# Patient Record
Sex: FEMALE | Race: WHITE | NOT HISPANIC OR LATINO | Employment: OTHER | URBAN - METROPOLITAN AREA
[De-identification: names, ages, dates, MRNs, and addresses within clinical notes are randomized per-mention and may not be internally consistent; named-entity substitution may affect disease eponyms.]

---

## 2018-11-28 ENCOUNTER — TRANSFERRED RECORDS (OUTPATIENT)
Dept: HEALTH INFORMATION MANAGEMENT | Facility: CLINIC | Age: 78
End: 2018-11-28

## 2018-11-28 LAB
ALT SERPL-CCNC: 34 IU/L (ref 8–45)
ALT SERPL-CCNC: 34 IU/L (ref 8–45)
AST SERPL-CCNC: 31 IU/L (ref 2–40)
CREAT SERPL-MCNC: 1.01 MG/DL (ref 0.57–1.11)
CREAT SERPL-MCNC: 1.01 MG/DL (ref 0.57–1.11)
GFR SERPL CREATININE-BSD FRML MDRD: 53 ML/MIN/1.73M2
GFR SERPL CREATININE-BSD FRML MDRD: 53 ML/MIN/1.73M2
GLUCOSE SERPL-MCNC: 94 MG/DL (ref 65–100)
GLUCOSE SERPL-MCNC: 94 MG/DL (ref 65–100)
POTASSIUM SERPL-SCNC: 4 MMOL/L (ref 3.5–5)
POTASSIUM SERPL-SCNC: 4 MMOL/L (ref 3.5–5)

## 2018-12-10 ENCOUNTER — TRANSFERRED RECORDS (OUTPATIENT)
Dept: HEALTH INFORMATION MANAGEMENT | Facility: CLINIC | Age: 78
End: 2018-12-10

## 2019-01-28 ENCOUNTER — TELEPHONE (OUTPATIENT)
Dept: RHEUMATOLOGY | Facility: CLINIC | Age: 79
End: 2019-01-28

## 2019-01-28 NOTE — TELEPHONE ENCOUNTER
Called patient after chart review. Patient has tried to taper to 2.5mg prednisone without success as pain becomes unbearable again. She will be seen in February as a new patient, encouraged her to continue prescribed prednisone.

## 2019-01-28 NOTE — TELEPHONE ENCOUNTER
[facility]    Phone Message    May a detailed message be left on voicemail: yes    Reason for Call: Patient is taking 5mg daily.  PCP prescribed enough to get her to her visit with Dr. Beaulieu.  Patient is requesting a call to discuss if the Prednisone is going to affect her blood work and if she should taper off or continue the medication until she sees Dr. Beaulieu. Patient has been taking the medication since the first of December.  Please advise.  Thank you.     Action Taken: Message routed to:  Adult Clinics: Rheumatology p [phone]

## 2019-02-15 ENCOUNTER — OFFICE VISIT (OUTPATIENT)
Dept: RHEUMATOLOGY | Facility: CLINIC | Age: 79
End: 2019-02-15
Payer: MEDICARE

## 2019-02-15 VITALS
RESPIRATION RATE: 18 BRPM | TEMPERATURE: 97.6 F | SYSTOLIC BLOOD PRESSURE: 129 MMHG | DIASTOLIC BLOOD PRESSURE: 75 MMHG | OXYGEN SATURATION: 99 % | WEIGHT: 139.2 LBS | HEART RATE: 57 BPM | HEIGHT: 63 IN | BODY MASS INDEX: 24.66 KG/M2

## 2019-02-15 DIAGNOSIS — R76.8 POSITIVE ANA (ANTINUCLEAR ANTIBODY): ICD-10-CM

## 2019-02-15 DIAGNOSIS — M35.3 POLYMYALGIA RHEUMATICA (H): Primary | ICD-10-CM

## 2019-02-15 DIAGNOSIS — Z79.60 LONG-TERM USE OF IMMUNOSUPPRESSANT MEDICATION: ICD-10-CM

## 2019-02-15 DIAGNOSIS — R94.31 ABNORMAL EKG: ICD-10-CM

## 2019-02-15 LAB
ALBUMIN SERPL-MCNC: 3.9 G/DL (ref 3.4–5)
ALBUMIN UR-MCNC: NEGATIVE MG/DL
ALP SERPL-CCNC: 61 U/L (ref 40–150)
ALT SERPL W P-5'-P-CCNC: 29 U/L (ref 0–50)
ANION GAP SERPL CALCULATED.3IONS-SCNC: 6 MMOL/L (ref 3–14)
APPEARANCE UR: CLEAR
AST SERPL W P-5'-P-CCNC: 21 U/L (ref 0–45)
BACTERIA #/AREA URNS HPF: ABNORMAL /HPF
BASOPHILS # BLD AUTO: 0.1 10E9/L (ref 0–0.2)
BASOPHILS NFR BLD AUTO: 0.8 %
BILIRUB SERPL-MCNC: 0.5 MG/DL (ref 0.2–1.3)
BILIRUB UR QL STRIP: NEGATIVE
BUN SERPL-MCNC: 25 MG/DL (ref 7–30)
CALCIUM SERPL-MCNC: 9.3 MG/DL (ref 8.5–10.1)
CARDIOLIPIN IGA SERPL-ACNC: 0.6 APL U/ML (ref 0–19.9)
CHLORIDE SERPL-SCNC: 105 MMOL/L (ref 94–109)
CK SERPL-CCNC: 136 U/L (ref 30–225)
CO2 SERPL-SCNC: 29 MMOL/L (ref 20–32)
COLOR UR AUTO: ABNORMAL
CREAT SERPL-MCNC: 1.3 MG/DL (ref 0.52–1.04)
CRP SERPL-MCNC: 7.7 MG/L (ref 0–8)
DIFFERENTIAL METHOD BLD: NORMAL
ENA JO1 IGG SER-ACNC: <0.2 AI (ref 0–0.9)
ENA RNP IGG SER IA-ACNC: 2.1 AI (ref 0–0.9)
ENA SCL70 IGG SER IA-ACNC: <0.2 AI (ref 0–0.9)
ENA SM IGG SER-ACNC: <0.2 AI (ref 0–0.9)
ENA SS-A IGG SER IA-ACNC: <0.2 AI (ref 0–0.9)
ENA SS-B IGG SER IA-ACNC: <0.2 AI (ref 0–0.9)
EOSINOPHIL # BLD AUTO: 0.1 10E9/L (ref 0–0.7)
EOSINOPHIL NFR BLD AUTO: 1 %
ERYTHROCYTE [DISTWIDTH] IN BLOOD BY AUTOMATED COUNT: 12.8 % (ref 10–15)
ERYTHROCYTE [SEDIMENTATION RATE] IN BLOOD BY WESTERGREN METHOD: 30 MM/H (ref 0–30)
GFR SERPL CREATININE-BSD FRML MDRD: 39 ML/MIN/{1.73_M2}
GLUCOSE SERPL-MCNC: 95 MG/DL (ref 70–99)
GLUCOSE UR STRIP-MCNC: NEGATIVE MG/DL
HCT VFR BLD AUTO: 40.6 % (ref 35–47)
HGB BLD-MCNC: 13.2 G/DL (ref 11.7–15.7)
HGB UR QL STRIP: NEGATIVE
IMM GRANULOCYTES # BLD: 0 10E9/L (ref 0–0.4)
IMM GRANULOCYTES NFR BLD: 0.3 %
KETONES UR STRIP-MCNC: NEGATIVE MG/DL
LEUKOCYTE ESTERASE UR QL STRIP: NEGATIVE
LYMPHOCYTES # BLD AUTO: 1.7 10E9/L (ref 0.8–5.3)
LYMPHOCYTES NFR BLD AUTO: 18.9 %
MCH RBC QN AUTO: 32.2 PG (ref 26.5–33)
MCHC RBC AUTO-ENTMCNC: 32.5 G/DL (ref 31.5–36.5)
MCV RBC AUTO: 99 FL (ref 78–100)
MONOCYTES # BLD AUTO: 0.6 10E9/L (ref 0–1.3)
MONOCYTES NFR BLD AUTO: 7 %
NEUTROPHILS # BLD AUTO: 6.6 10E9/L (ref 1.6–8.3)
NEUTROPHILS NFR BLD AUTO: 72 %
NITRATE UR QL: NEGATIVE
NON-SQ EPI CELLS #/AREA URNS LPF: ABNORMAL /LPF
PH UR STRIP: 7.5 PH (ref 5–7)
PLATELET # BLD AUTO: 261 10E9/L (ref 150–450)
POTASSIUM SERPL-SCNC: 3.7 MMOL/L (ref 3.4–5.3)
PROT SERPL-MCNC: 8.1 G/DL (ref 6.8–8.8)
PTH-INTACT SERPL-MCNC: 49 PG/ML (ref 18–80)
RBC # BLD AUTO: 4.1 10E12/L (ref 3.8–5.2)
RBC #/AREA URNS AUTO: ABNORMAL /HPF
SODIUM SERPL-SCNC: 140 MMOL/L (ref 133–144)
SOURCE: ABNORMAL
SP GR UR STRIP: 1.01 (ref 1–1.03)
UROBILINOGEN UR STRIP-MCNC: NORMAL MG/DL (ref 0–2)
WBC # BLD AUTO: 9.1 10E9/L (ref 4–11)
WBC #/AREA URNS AUTO: ABNORMAL /HPF

## 2019-02-15 PROCEDURE — 81001 URINALYSIS AUTO W/SCOPE: CPT | Performed by: INTERNAL MEDICINE

## 2019-02-15 PROCEDURE — 86235 NUCLEAR ANTIGEN ANTIBODY: CPT | Performed by: INTERNAL MEDICINE

## 2019-02-15 PROCEDURE — 86160 COMPLEMENT ANTIGEN: CPT | Performed by: INTERNAL MEDICINE

## 2019-02-15 PROCEDURE — 86147 CARDIOLIPIN ANTIBODY EA IG: CPT | Performed by: INTERNAL MEDICINE

## 2019-02-15 PROCEDURE — 86038 ANTINUCLEAR ANTIBODIES: CPT | Performed by: INTERNAL MEDICINE

## 2019-02-15 PROCEDURE — 86146 BETA-2 GLYCOPROTEIN ANTIBODY: CPT | Performed by: INTERNAL MEDICINE

## 2019-02-15 PROCEDURE — 82784 ASSAY IGA/IGD/IGG/IGM EACH: CPT | Performed by: INTERNAL MEDICINE

## 2019-02-15 PROCEDURE — 93000 ELECTROCARDIOGRAM COMPLETE: CPT | Performed by: INTERNAL MEDICINE

## 2019-02-15 PROCEDURE — 86225 DNA ANTIBODY NATIVE: CPT | Performed by: INTERNAL MEDICINE

## 2019-02-15 PROCEDURE — 86140 C-REACTIVE PROTEIN: CPT | Performed by: INTERNAL MEDICINE

## 2019-02-15 PROCEDURE — 86334 IMMUNOFIX E-PHORESIS SERUM: CPT | Performed by: INTERNAL MEDICINE

## 2019-02-15 PROCEDURE — 86039 ANTINUCLEAR ANTIBODIES (ANA): CPT | Performed by: INTERNAL MEDICINE

## 2019-02-15 PROCEDURE — 83970 ASSAY OF PARATHORMONE: CPT | Performed by: INTERNAL MEDICINE

## 2019-02-15 PROCEDURE — 99205 OFFICE O/P NEW HI 60 MIN: CPT | Mod: 25 | Performed by: INTERNAL MEDICINE

## 2019-02-15 PROCEDURE — 85025 COMPLETE CBC W/AUTO DIFF WBC: CPT | Performed by: INTERNAL MEDICINE

## 2019-02-15 PROCEDURE — 85652 RBC SED RATE AUTOMATED: CPT | Performed by: INTERNAL MEDICINE

## 2019-02-15 PROCEDURE — 82550 ASSAY OF CK (CPK): CPT | Performed by: INTERNAL MEDICINE

## 2019-02-15 PROCEDURE — 36415 COLL VENOUS BLD VENIPUNCTURE: CPT | Performed by: INTERNAL MEDICINE

## 2019-02-15 PROCEDURE — 82306 VITAMIN D 25 HYDROXY: CPT | Performed by: INTERNAL MEDICINE

## 2019-02-15 PROCEDURE — 80053 COMPREHEN METABOLIC PANEL: CPT | Performed by: INTERNAL MEDICINE

## 2019-02-15 RX ORDER — ACETAMINOPHEN 325 MG/1
325 TABLET ORAL PRN
COMMUNITY

## 2019-02-15 RX ORDER — LEVOTHYROXINE SODIUM 75 UG/1
75 TABLET ORAL DAILY
COMMUNITY
Start: 2018-10-29

## 2019-02-15 RX ORDER — PREDNISONE 5 MG/1
5 TABLET ORAL DAILY
Qty: 60 TABLET | Refills: 1 | Status: SHIPPED | OUTPATIENT
Start: 2019-02-15 | End: 2019-02-18

## 2019-02-15 RX ORDER — IBUPROFEN 200 MG
200 TABLET ORAL EVERY 4 HOURS PRN
COMMUNITY
End: 2020-04-10

## 2019-02-15 RX ORDER — PREDNISONE 5 MG/1
5 TABLET ORAL DAILY
COMMUNITY
Start: 2019-01-28 | End: 2019-02-18

## 2019-02-15 ASSESSMENT — PAIN SCALES - GENERAL: PAINLEVEL: EXTREME PAIN (8)

## 2019-02-15 ASSESSMENT — MIFFLIN-ST. JEOR: SCORE: 1075.54

## 2019-02-15 NOTE — NURSING NOTE
"Libertad Sanchez's goals for this visit include: Consult  She requests these members of her care team be copied on today's visit information: PCP    PCP: Barber Lopez    Referring Provider:  Barber Lopez MD  [address] [address]    /75   Pulse 57   Temp 97.6  F (36.4  C)   Resp 18   Ht 1.6 m (5' 3\")   Wt 63.1 kg (139 lb 3.2 oz)   SpO2 99%   BMI 24.66 kg/m      Do you need any medication refills at today's visit? N    "

## 2019-02-16 LAB
CARDIOLIPIN ANTIBODY IGG: <1.6 GPL-U/ML (ref 0–19.9)
CARDIOLIPIN ANTIBODY IGM: 2.4 MPL-U/ML (ref 0–19.9)

## 2019-02-18 DIAGNOSIS — R76.8 POSITIVE ANA (ANTINUCLEAR ANTIBODY): ICD-10-CM

## 2019-02-18 DIAGNOSIS — M35.3 POLYMYALGIA RHEUMATICA (H): ICD-10-CM

## 2019-02-18 DIAGNOSIS — Z79.60 LONG-TERM USE OF IMMUNOSUPPRESSANT MEDICATION: ICD-10-CM

## 2019-02-18 LAB
ANA PAT SER IF-IMP: ABNORMAL
ANA SER QL IF: POSITIVE
ANA TITR SER IF: ABNORMAL {TITER}
B2 GLYCOPROT1 IGG SERPL IA-ACNC: 0.8 U/ML
C3 SERPL-MCNC: 98 MG/DL (ref 76–169)
C4 SERPL-MCNC: 21 MG/DL (ref 15–50)
DEPRECATED CALCIDIOL+CALCIFEROL SERPL-MC: <63 UG/L (ref 20–75)
DSDNA AB SER-ACNC: 1 IU/ML
IGA SERPL-MCNC: 156 MG/DL (ref 70–380)
IGG SERPL-MCNC: 1720 MG/DL (ref 695–1620)
IGM SERPL-MCNC: 78 MG/DL (ref 60–265)
PROT PATTERN SERPL IFE-IMP: ABNORMAL
VITAMIN D2 SERPL-MCNC: <5 UG/L
VITAMIN D3 SERPL-MCNC: 58 UG/L

## 2019-02-18 RX ORDER — PREDNISONE 5 MG/1
10 TABLET ORAL DAILY
Qty: 180 TABLET | Refills: 1 | Status: SHIPPED | OUTPATIENT
Start: 2019-02-18 | End: 2019-02-18

## 2019-02-18 RX ORDER — PREDNISONE 5 MG/1
10 TABLET ORAL DAILY
Qty: 180 TABLET | Refills: 1 | Status: SHIPPED | OUTPATIENT
Start: 2019-02-18 | End: 2019-04-26

## 2019-03-12 ENCOUNTER — TRANSFERRED RECORDS (OUTPATIENT)
Dept: HEALTH INFORMATION MANAGEMENT | Facility: CLINIC | Age: 79
End: 2019-03-12

## 2019-03-12 LAB — EJECTION FRACTION: 63

## 2019-03-19 ENCOUNTER — DOCUMENTATION ONLY (OUTPATIENT)
Dept: RHEUMATOLOGY | Facility: CLINIC | Age: 79
End: 2019-03-19

## 2019-03-29 NOTE — PROGRESS NOTES
Order(s) created erroneously. Erroneous order ID: [id number]   Order canceled by: DAMON WOLFE   Order cancel date/time: 03/29/2019 7:42 AM

## 2019-04-16 ENCOUNTER — OFFICE VISIT (OUTPATIENT)
Dept: ENDOCRINOLOGY | Facility: CLINIC | Age: 79
End: 2019-04-16
Attending: INTERNAL MEDICINE
Payer: MEDICARE

## 2019-04-16 VITALS
HEART RATE: 63 BPM | SYSTOLIC BLOOD PRESSURE: 114 MMHG | BODY MASS INDEX: 25.46 KG/M2 | WEIGHT: 143.7 LBS | OXYGEN SATURATION: 96 % | DIASTOLIC BLOOD PRESSURE: 70 MMHG

## 2019-04-16 DIAGNOSIS — Z79.52 CURRENT CHRONIC USE OF SYSTEMIC STEROIDS: Primary | ICD-10-CM

## 2019-04-16 PROCEDURE — 99204 OFFICE O/P NEW MOD 45 MIN: CPT | Performed by: INTERNAL MEDICINE

## 2019-04-16 NOTE — PATIENT INSTRUCTIONS
We will contact Dr. Cooper regarding tapering of steroid; prednisone dose.     Once you are on low dose prednisone; less than or equal to 5 mg daily; we will check you for possibility of adrenal insufficiency by doing blood work.  You will hear from us.

## 2019-04-16 NOTE — PROGRESS NOTES
Endocrinology Clinic Visit    Chief Complaint: Consult (adrenal insufficiency)     Information obtained from:Patient here with her daughter today.    Subjective:         HPI: Libertad Sanchez is a 78 year old female with history of possibly polymyalgia rheumatica currently on steroid therapy who is seen in consultation at Allan Beaulieu's request for possible adrenal insufficiency.    She has been having muscle pain, joint pains mainly involving upper extremity shoulder area.  This symptoms that started in October 2018.  In November her symptoms got worse to the point that she was able to barely walk to the bathroom due to muscle cramps and in December 2018 she was started on prednisone 20 mg daily and was treated with this dose for 1 week.  Later on prednisone was dropped to 5 mg daily.  She was recently evaluated by rheumatology clinic and during that visits her dose of prednisone was increased to 10 mg daily due to continued muscle pain joint pains and also dizziness.  She has been on prednisone 10 mg daily since February 2019.  At this visit patient denies any dizziness, muscle pain, joint pains, fatigue or tiredness.    When she was on 5 mg of prednisone her main symptoms were muscle aches, joint pain and occasional dizziness which was positional(assuming standing up position from sitting down position).  However, she did not have any nausea, vomiting, diarrhea or weight loss.  She added that she had lost about 10 pounds prior to December; before she was started on the steroid.  However she has gained back 4 pounds since.    Reviewed her outside records; her primary care physician is Dr. Lopez with [facility].  Her basic metabolic panel from November 2018 showed normal electrolytes and creatinine level.  This was prior to she was started on a steroid therapy.  Complete blood count was within the normal limits.  She is currently on levothyroxine 75 mcg daily; has a past history of hypothyroidism her last TSH in  October of thousand 18 was normal at 3.15.    History of osteoporosis last DEXA done in 2017.  She was treated with Actonel however stopped it in April 2018 due to muscle cramps.      CHELSEY positive.      No Known Allergies    Current Outpatient Medications   Medication Sig Dispense Refill     acetaminophen (TYLENOL) 325 MG tablet Take 325 mg by mouth as needed       Calcium Carb-Cholecalciferol 500-400 MG-UNIT CHEW Take 1 tablet by mouth daily       ibuprofen (ADVIL/MOTRIN) 200 MG tablet Take 200 mg by mouth every 4 hours as needed       levothyroxine (SYNTHROID/LEVOTHROID) 75 MCG tablet Take 75 mcg by mouth daily       predniSONE (DELTASONE) 5 MG tablet Take 10 mg by mouth daily. (Patient taking differently: Take 5 mg by mouth 2 times daily .) 180 tablet 1       Review of Systems     11 point review system (Constitutional, HENT, Eyes, Respiratory, Cardiovascular, Gastrointestinal, Genitourinary, Musculoskeletal,Neurological, Psychiatric/Behavioural, Endocrine) is negative or is as per HPI above    Past Medical History:   Diagnosis Date     Colitis      Hypothyroidism      Osteoporosis    Normal thyroid exam findings and no nodules appreciated    Past Surgical History:   Procedure Laterality Date     TONSILLECTOMY         Family History   Problem Relation Age of Onset     Alzheimer Disease Mother        Social History     Socioeconomic History     Marital status:      Spouse name: None     Number of children: None     Years of education: None     Highest education level: None   Occupational History     None   Social Needs     Financial resource strain: None     Food insecurity:     Worry: None     Inability: None     Transportation needs:     Medical: None     Non-medical: None   Tobacco Use     Smoking status: Never Smoker     Smokeless tobacco: Never Used   Substance and Sexual Activity     Alcohol use: None     Drug use: None     Sexual activity: None   Lifestyle     Physical activity:     Days per week:  None     Minutes per session: None     Stress: None   Relationships     Social connections:     Talks on phone: None     Gets together: None     Attends Confucianist service: None     Active member of club or organization: None     Attends meetings of clubs or organizations: None     Relationship status: None     Intimate partner violence:     Fear of current or ex partner: None     Emotionally abused: None     Physically abused: None     Forced sexual activity: None   Other Topics Concern     None   Social History Narrative     None       Objective:   /70 (BP Location: Left arm, Patient Position: Sitting, Cuff Size: Adult Regular)   Pulse 63   Wt 65.2 kg (143 lb 11.2 oz)   SpO2 96%   BMI 25.46 kg/m    Constitutional: Pleasant no acute cardiopulmonary distress.   EYES: anicteric, normal extra-ocular movements, no lid lag or retraction.  HEENT: Mouth/Throat: Mucous membrane is moist. Oropharynx is clear.  Thyroid examination: Thyroid is atrophic.  No nodules appreciated  Cardiovascular: RRR, S1, S2 normal.   Pulmonary/Chest: CTAB. No wheezing or rales.   Abdominal: +BS. Non tender to palpation.    Neurological: Alert and oriented.  No tremor and reflexes are symmetrical bilaterally and within the normal limits. Muscle strength 5/5.   Extremities: No edema.  Psychological: appropriate mood and affect     In House Labs:      Ref. Range 2/15/2019 10:16   Sodium Latest Ref Range: 133 - 144 mmol/L 140   Potassium Latest Ref Range: 3.4 - 5.3 mmol/L 3.7   Chloride Latest Ref Range: 94 - 109 mmol/L 105   Carbon Dioxide Latest Ref Range: 20 - 32 mmol/L 29   Urea Nitrogen Latest Ref Range: 7 - 30 mg/dL 25   Creatinine Latest Ref Range: 0.52 - 1.04 mg/dL 1.30 (H)   GFR Estimate Latest Ref Range: >60 mL/min/1.73_m2 39 (L)   GFR Estimate If Black Latest Ref Range: >60 mL/min/1.73_m2 45 (L)   Calcium Latest Ref Range: 8.5 - 10.1 mg/dL 9.3   Anion Gap Latest Ref Range: 3 - 14 mmol/L 6   Albumin Latest Ref Range: 3.4 - 5.0  g/dL 3.9   Protein Total Latest Ref Range: 6.8 - 8.8 g/dL 8.1   Bilirubin Total Latest Ref Range: 0.2 - 1.3 mg/dL 0.5   Alkaline Phosphatase Latest Ref Range: 40 - 150 U/L 61   ALT Latest Ref Range: 0 - 50 U/L 29   AST Latest Ref Range: 0 - 45 U/L 21   25 OH Vit D total Latest Ref Range: 20 - 75 ug/L <63   25 OH Vit D2 Latest Units: ug/L <5   25 OH Vit D3 Latest Units: ug/L 58   CK Total Latest Ref Range: 30 - 225 U/L 136       Creatinine   Date Value Ref Range Status   02/15/2019 1.30 (H) 0.52 - 1.04 mg/dL Final       Assessment/Treatment Plan:      Current chronic steroid use; concern for possible adrenal insufficiency: patient with history of possible polymyalgia rheumatica vis-à-vis connective tissue disorder with antinuclear antibody positive currently on a steroid therapy.  Therapy with prednisone was started in December 2018 mainly for muscle cramps, joint pains and stiffness.  Prednisone initial dose was 20 mg daily and after a week it was dropped to 5 mg daily.  Patient did not feel well on prednisone 5 mg daily therefore her dose was increased to 10 mg daily.  She has been on prednisone 10 mg daily at least for the last 6 weeks.    Patient's signs and symptoms prior to steroid therapy and also after reduction dose of prednisone 5 mg daily; not consistent with adrenal insufficiency.  In addition, prednisone 5 mg daily is more than physiologic dose; therefore would not expect the patient to have adrenal insufficiency symptoms on this dose.  The fact that patient continues to have symptoms while taking prednisone 5 mg daily makes me think of adrenal insufficiency this patient is less likely.  I have reviewed her outside records particularly basic metabolic panels from November 2018 before she was started on steroid which were normal.    Even though the likelihood of adrenal insufficiency is low in this patient; would be reasonable completely rule out by checking the hypothalamus pituitary axis.  Currently the  hypothalamus pituitary axis would be suppressed due to the super-physiologic exogenous steroid she is getting.  In order to test, the first step would be to slowly taper down her prednisone to the lowest possible dose and check the HPA axis.  Discussed with Dr. Beaulieu; would let us know once patient is on a lower dose of prednisone.      Once she is on the lowest possible dose of prednisone; = or less than 5 mg; will check a morning cortisol with ACTH level or will perform a ACTH stim test to screen for adrenal insufficiency.    Osteoporosis: History of osteoporosis last DEXA done in 2017.  She was treated with Actonel however stopped it in April 2018 due to muscle cramps.  She stated that regarding this issue she would like to follow-up with her primary care physician.      Patient Instructions   We will contact Dr. Cooper regarding tapering of steroid; prednisone dose.     Once you are on low dose prednisone; less than or equal to 5 mg daily; we will check you for possibility of adrenal insufficiency by doing blood work.  You will hear from us.       I will contact the patient with the test results.  Return to clinic in 6 months.    Test and/or medications prescribed today:  No orders of the defined types were placed in this encounter.        Marvel Ignacio MD  Staff Endocrinologist    [phone]  Division of Endocrinology and Diabetes

## 2019-04-16 NOTE — NURSING NOTE
Libertad Sanchez's goals for this visit include:   Chief Complaint   Patient presents with     Consult     adrenal insufficiency     She requests these members of her care team be copied on today's visit information: Yes    PCP: Barber Lopez    Referring Provider:  Allan Beaulieu MD  [address] [place] [id number]    /70 (BP Location: Left arm, Patient Position: Sitting, Cuff Size: Adult Regular)   Pulse 63   Wt 65.2 kg (143 lb 11.2 oz)   SpO2 96%   BMI 25.46 kg/m      Do you need any medication refills at today's visit? No

## 2019-04-16 NOTE — Clinical Note
Dr. Beaulieu, thank you for sending this patient.  I saw Libertad today regarding the question of possible adrenal insufficiency.  Clinically it does not look like that she has adrenal insufficiency; however would be reasonable to screen her with biochemical testing.  In order to do that, she needs to be on the prednisone closer to physiologic dose which is 5 mg.  I wanted to check with you the plan regarding prednisone from a rheumatologic standpoint.  If current dose of prednisone is not needed from a rheumatologic standpoint and tapering it is okay; I will contact the patient to slowly taper in preparation for the workup.  If current dose of prednisone therapy is needed from a rheumatologic standpoint; then we can wait until tapering is possible.  In the meantime, she should be covered from AI stand point until prednisone dose going to less than 5 mg. Please let me know your thoughts when you get a chance. Thank you, Marvel Ignacio

## 2019-04-26 ENCOUNTER — OFFICE VISIT (OUTPATIENT)
Dept: RHEUMATOLOGY | Facility: CLINIC | Age: 79
End: 2019-04-26
Payer: MEDICARE

## 2019-04-26 VITALS
HEART RATE: 61 BPM | RESPIRATION RATE: 18 BRPM | TEMPERATURE: 97.6 F | HEIGHT: 63 IN | SYSTOLIC BLOOD PRESSURE: 135 MMHG | DIASTOLIC BLOOD PRESSURE: 72 MMHG | WEIGHT: 143 LBS | BODY MASS INDEX: 25.34 KG/M2 | OXYGEN SATURATION: 97 %

## 2019-04-26 DIAGNOSIS — M35.3 PMR (POLYMYALGIA RHEUMATICA) (H): Chronic | ICD-10-CM

## 2019-04-26 DIAGNOSIS — R76.8 POSITIVE ANA (ANTINUCLEAR ANTIBODY): ICD-10-CM

## 2019-04-26 DIAGNOSIS — M19.042 PRIMARY OSTEOARTHRITIS OF BOTH HANDS: Chronic | ICD-10-CM

## 2019-04-26 DIAGNOSIS — Z79.60 LONG-TERM USE OF IMMUNOSUPPRESSANT MEDICATION: ICD-10-CM

## 2019-04-26 DIAGNOSIS — M81.0 AGE-RELATED OSTEOPOROSIS WITHOUT CURRENT PATHOLOGICAL FRACTURE: Chronic | ICD-10-CM

## 2019-04-26 DIAGNOSIS — M19.041 PRIMARY OSTEOARTHRITIS OF BOTH HANDS: Chronic | ICD-10-CM

## 2019-04-26 DIAGNOSIS — M35.3 POLYMYALGIA RHEUMATICA (H): ICD-10-CM

## 2019-04-26 LAB
ALBUMIN SERPL-MCNC: 3.9 G/DL (ref 3.4–5)
ALBUMIN UR-MCNC: NEGATIVE MG/DL
ALP SERPL-CCNC: 54 U/L (ref 40–150)
ALT SERPL W P-5'-P-CCNC: 38 U/L (ref 0–50)
ANION GAP SERPL CALCULATED.3IONS-SCNC: 3 MMOL/L (ref 3–14)
APPEARANCE UR: CLEAR
AST SERPL W P-5'-P-CCNC: 28 U/L (ref 0–45)
BILIRUB SERPL-MCNC: 0.5 MG/DL (ref 0.2–1.3)
BILIRUB UR QL STRIP: NEGATIVE
BUN SERPL-MCNC: 20 MG/DL (ref 7–30)
CALCIUM SERPL-MCNC: 9.3 MG/DL (ref 8.5–10.1)
CHLORIDE SERPL-SCNC: 105 MMOL/L (ref 94–109)
CO2 SERPL-SCNC: 31 MMOL/L (ref 20–32)
COLOR UR AUTO: NORMAL
CREAT SERPL-MCNC: 1.04 MG/DL (ref 0.52–1.04)
CREAT UR-MCNC: 27 MG/DL
CRP SERPL-MCNC: 11.9 MG/L (ref 0–8)
ERYTHROCYTE [DISTWIDTH] IN BLOOD BY AUTOMATED COUNT: 13.3 % (ref 10–15)
ERYTHROCYTE [SEDIMENTATION RATE] IN BLOOD BY WESTERGREN METHOD: 26 MM/H (ref 0–30)
GFR SERPL CREATININE-BSD FRML MDRD: 51 ML/MIN/{1.73_M2}
GLUCOSE SERPL-MCNC: 96 MG/DL (ref 70–99)
GLUCOSE UR STRIP-MCNC: NEGATIVE MG/DL
HCT VFR BLD AUTO: 41.7 % (ref 35–47)
HGB BLD-MCNC: 13.6 G/DL (ref 11.7–15.7)
HGB UR QL STRIP: NEGATIVE
KETONES UR STRIP-MCNC: NEGATIVE MG/DL
LEUKOCYTE ESTERASE UR QL STRIP: NEGATIVE
MCH RBC QN AUTO: 32.2 PG (ref 26.5–33)
MCHC RBC AUTO-ENTMCNC: 32.6 G/DL (ref 31.5–36.5)
MCV RBC AUTO: 99 FL (ref 78–100)
NITRATE UR QL: NEGATIVE
PH UR STRIP: 7 PH (ref 5–7)
PLATELET # BLD AUTO: 230 10E9/L (ref 150–450)
POTASSIUM SERPL-SCNC: 3.8 MMOL/L (ref 3.4–5.3)
PROT SERPL-MCNC: 8 G/DL (ref 6.8–8.8)
PROT UR-MCNC: 0.07 G/L
PROT/CREAT 24H UR: 0.25 G/G CR (ref 0–0.2)
RBC # BLD AUTO: 4.22 10E12/L (ref 3.8–5.2)
RBC #/AREA URNS AUTO: NORMAL /HPF
SODIUM SERPL-SCNC: 139 MMOL/L (ref 133–144)
SOURCE: NORMAL
SP GR UR STRIP: 1.01 (ref 1–1.03)
UROBILINOGEN UR STRIP-MCNC: NORMAL MG/DL (ref 0–2)
WBC # BLD AUTO: 10 10E9/L (ref 4–11)
WBC #/AREA URNS AUTO: NORMAL /HPF

## 2019-04-26 PROCEDURE — 85027 COMPLETE CBC AUTOMATED: CPT | Performed by: INTERNAL MEDICINE

## 2019-04-26 PROCEDURE — 36415 COLL VENOUS BLD VENIPUNCTURE: CPT | Performed by: INTERNAL MEDICINE

## 2019-04-26 PROCEDURE — 85652 RBC SED RATE AUTOMATED: CPT | Performed by: INTERNAL MEDICINE

## 2019-04-26 PROCEDURE — 84156 ASSAY OF PROTEIN URINE: CPT | Performed by: INTERNAL MEDICINE

## 2019-04-26 PROCEDURE — 80053 COMPREHEN METABOLIC PANEL: CPT | Performed by: INTERNAL MEDICINE

## 2019-04-26 PROCEDURE — 81001 URINALYSIS AUTO W/SCOPE: CPT | Performed by: INTERNAL MEDICINE

## 2019-04-26 PROCEDURE — 86140 C-REACTIVE PROTEIN: CPT | Performed by: INTERNAL MEDICINE

## 2019-04-26 PROCEDURE — 99215 OFFICE O/P EST HI 40 MIN: CPT | Performed by: INTERNAL MEDICINE

## 2019-04-26 RX ORDER — PREDNISONE 5 MG/1
TABLET ORAL
Qty: 270 TABLET | Refills: 1 | Status: SHIPPED | OUTPATIENT
Start: 2019-04-26 | End: 2019-07-19

## 2019-04-26 ASSESSMENT — MIFFLIN-ST. JEOR: SCORE: 1092.77

## 2019-04-26 ASSESSMENT — PAIN SCALES - GENERAL: PAINLEVEL: EXTREME PAIN (8)

## 2019-04-26 ASSESSMENT — ROUTINE ASSESSMENT OF PATIENT INDEX DATA (RAPID3)
RAPID3 INTERPRETATION: HIGH > 12.0
TOTAL RAPID3 SCORE: 16.3

## 2019-04-26 NOTE — NURSING NOTE
"Libertad Sanchez's goals for this visit include: Return  She requests these members of her care team be copied on today's visit information: PCP    PCP: Barber Lopez    Referring Provider:  No referring provider defined for this encounter.    /72   Pulse 61   Temp 97.6  F (36.4  C)   Resp 18   Ht 1.6 m (5' 3\")   Wt 64.9 kg (143 lb)   SpO2 97%   BMI 25.33 kg/m      Do you need any medication refills at today's visit? N    "

## 2019-04-26 NOTE — PROGRESS NOTES
Ms. Sanchez returns for evaluation and management of chronic arthralgias, myalgias thought to be PMR. +CHELSEY 1:320, +RNP, dsDNA-, Sm-, SSA/B-,     She reports less dizziness and palpitation today on the higher dose of prednisone. An EKG questioned possible septal infarct, but an echocardiogram was benign. She will continue to monitor this with her primary care provider.    Her recent evaluation demonstrated the presence of the RNP antibody in addition to her CHELSEY. She has tolerated the increase in prednisone to 10 mg daily, and feels this higher dose has made her feel better. She has more significant pain on intermittent days. No significant AM stiffness. Energy is still reduced. She has had physical therapy. Facial redness persists but no other rash today. No oral ulcers. Her joints are stable and no swelling, redness, or warmth. During pain attacks this is more a myalgia.     Ibuprofen 400 mg is currently only rarely used.     PMI:  Medical-hypothyroidism, osteoporosis, empyema, carpal tunnel disease right, collagenous colitis, plantar fasciitis, eczema  Surgical-bunionectomy bilateral, carpal tunnel release, cataract surgery bilateral, right lung resection/pleural surgery/evacuation, tonsillectomy  Injuries-none    SH:  , previous  provider, 5 children, no tobacco or EtOH. Right handed.    FH:  Mother dead with alzheimer's  Father dead with MI  Sibs and children are healthy    PMSF history personally obtained and updated by me this visit.    ROS:  +persistent colitis  +actonel and alendronate intolerant  Remainder of the 14 point ROS obtained and found negative.    Physical Exam:  Constitutional: WD-[id number] cooperative  Eyes: nl EOM, PERRLA, vision, conjunctiva, sclera  ENT: nl external ears, nose, hearing, lips, teeth, gums, throat  Neck: no mass or thyroid enlargement  Resp: lungs clear to auscultation, nl to palpation, nl effort  CV: RRR, no murmurs, rubs or gallops, no edema  GI: no ABD mass or  tenderness, no HSM  : not tested  Lymph: no cervical or epitrochlear nodes  MS: +diffuse heberden's nodes and right PIP 2,3 perez's nodes; subtle 1st CMC squaring and thumb Z-laxity; normal , tuck and prayer; neck rotation limited to 30 degrees right and 45 degrees left with slightly reduced flexion and extension; All other TMJ, neck, shoulder, elbow, wrist, hand, spine, hip, knee, ankle, and foot joints were examined and otherwise found normal. No active synovitis.  Skin: no nail pitting, alopecia, rash  Neuro: nl cranial nerves, strength, sensation, DTRs.   Psych: nl judgement, orientation, memory, affect.    Laboratory:       Component      Latest Ref Rng & Units 2/15/2019   WBC      4.0 - 11.0 10e9/L 9.1   RBC Count      3.8 - 5.2 10e12/L 4.10   Hemoglobin      11.7 - 15.7 g/dL 13.2   Hematocrit      35.0 - 47.0 % 40.6   MCV      78 - 100 fl 99   MCH      26.5 - 33.0 pg 32.2   MCHC      31.5 - 36.5 g/dL 32.5   RDW      10.0 - 15.0 % 12.8   Platelet Count      150 - 450 10e9/L 261   Diff Method       Automated Method   % Neutrophils      % 72.0   % Lymphocytes      % 18.9   % Monocytes      % 7.0   % Eosinophils      % 1.0   % Basophils      % 0.8   % Immature Granulocytes      % 0.3   Absolute Neutrophil      1.6 - 8.3 10e9/L 6.6   Absolute Lymphocytes      0.8 - 5.3 10e9/L 1.7   Absolute Monocytes      0.0 - 1.3 10e9/L 0.6   Absolute Eosinophils      0.0 - 0.7 10e9/L 0.1   Absolute Basophils      0.0 - 0.2 10e9/L 0.1   Abs Immature Granulocytes      0 - 0.4 10e9/L 0.0   Color Urine       Light Yellow   Appearance Urine       Clear   Glucose Urine      NEG:Negative mg/dL Negative   Bilirubin Urine      NEG:Negative Negative   Ketones Urine      NEG:Negative mg/dL Negative   Specific Gravity Urine      1.003 - 1.035 1.006   Blood Urine      NEG:Negative Negative   pH Urine      5.0 - 7.0 pH 7.5 (H)   Protein Albumin Urine      NEG:Negative mg/dL Negative   Urobilinogen mg/dL      0.0 - 2.0 mg/dL Normal    Nitrite Urine      NEG:Negative Negative   Leukocyte Esterase Urine      NEG:Negative Negative   Source       Midstream Urine   WBC Urine      OTO5:0 - 5 /HPF 0 - 5   RBC Urine      OTO2:O - 2 /HPF O - 2   Bacteria Urine      NEG:Negative /HPF Few (A)   Squamous Epithelial /LPF Urine      FEW:Few /LPF Few   Sodium      133 - 144 mmol/L 140   Potassium      3.4 - 5.3 mmol/L 3.7   Chloride      94 - 109 mmol/L 105   Carbon Dioxide      20 - 32 mmol/L 29   Anion Gap      3 - 14 mmol/L 6   Glucose      70 - 99 mg/dL 95   Urea Nitrogen      7 - 30 mg/dL 25   Creatinine      0.52 - 1.04 mg/dL 1.30 (H)   GFR Estimate      >60 mL/min/1.73:m2 39 (L)   GFR Estimate If Black      >60 mL/min/1.73:m2 45 (L)   Calcium      8.5 - 10.1 mg/dL 9.3   Bilirubin Total      0.2 - 1.3 mg/dL 0.5   Albumin      3.4 - 5.0 g/dL 3.9   Protein Total      6.8 - 8.8 g/dL 8.1   Alkaline Phosphatase      40 - 150 U/L 61   ALT      0 - 50 U/L 29   AST      0 - 45 U/L 21   RNP Antibody IgG      0.0 - 0.9 AI 2.1 (H)   Harris SANDRO Antibody IgG      0.0 - 0.9 AI <0.2   SSA (Ro) (SANDRO) Antibody, IgG      0.0 - 0.9 AI <0.2   SSB (La) (SANDRO) Antibody, IgG      0.0 - 0.9 AI <0.2   Scleroderma Antibody Scl-70 SANDRO IgG      0.0 - 0.9 AI <0.2   Immunofixation ELP       No monoclonal protein seen on immunofixation.  Pathological significance requires clinical . . .   IGG      695 - 1,620 mg/dL 1,720 (H)   IGA      70 - 380 mg/dL 156   IGM      60 - 265 mg/dL 78   CHELSEY interpretation      NEG:Negative Positive (A)   CHELSEY pattern 1       SPECKLED   CHELSEY titer 1       1:320   25 OH Vit D2      ug/L <5   25 OH Vit D3      ug/L 58   25 OH Vit D total      20 - 75 ug/L <63   Cardiolipin Antibody IgG      0.0 - 19.9 GPL-U/mL <1.6   Cardiolipin Antibody IgM      0.0 - 19.9 MPL-U/mL 2.4   DNA-ds      <10 IU/mL 1   Cardiolipin Antibody IgA      0.0 - 19.9 APL U/mL 0.6   Mery 1 Antibody IgG      0.0 - 0.9 AI <0.2   Beta 2 Glycoprotein 1 Antibody IgG      <7 U/mL 0.8   Sed Rate       0 - 30 mm/h 30   CRP Inflammation      0.0 - 8.0 mg/L 7.7   Complement C4      15 - 50 mg/dL 21   Complement C3      76 - 169 mg/dL 98   CK Total      30 - 225 U/L 136   Parathyroid Hormone Intact      18 - 80 pg/mL 49     Component      Latest Ref Rng & Units 4/26/2019   Sodium      133 - 144 mmol/L 139   Potassium      3.4 - 5.3 mmol/L 3.8   Chloride      94 - 109 mmol/L 105   Carbon Dioxide      20 - 32 mmol/L 31   Anion Gap      3 - 14 mmol/L 3   Glucose      70 - 99 mg/dL 96   Urea Nitrogen      7 - 30 mg/dL 20   Creatinine      0.52 - 1.04 mg/dL 1.04   GFR Estimate      >60 mL/min/1.73:m2 51 (L)   GFR Estimate If Black      >60 mL/min/1.73:m2 59 (L)   Calcium      8.5 - 10.1 mg/dL 9.3   Bilirubin Total      0.2 - 1.3 mg/dL 0.5   Albumin      3.4 - 5.0 g/dL 3.9   Protein Total      6.8 - 8.8 g/dL 8.0   Alkaline Phosphatase      40 - 150 U/L 54   ALT      0 - 50 U/L 38   AST      0 - 45 U/L 28   Color Urine       Straw   Appearance Urine       Clear   Glucose Urine      NEG:Negative mg/dL Negative   Bilirubin Urine      NEG:Negative Negative   Ketones Urine      NEG:Negative mg/dL Negative   Specific Gravity Urine      1.003 - 1.035 1.006   Blood Urine      NEG:Negative Negative   pH Urine      5.0 - 7.0 pH 7.0   Protein Albumin Urine      NEG:Negative mg/dL Negative   Urobilinogen mg/dL      0.0 - 2.0 mg/dL Normal   Nitrite Urine      NEG:Negative Negative   Leukocyte Esterase Urine      NEG:Negative Negative   Source       Midstream Urine   WBC Urine      OTO5:0 - 5 /HPF 0 - 5   RBC Urine      OTO2:O - 2 /HPF O - 2   WBC      4.0 - 11.0 10e9/L 10.0   RBC Count      3.8 - 5.2 10e12/L 4.22   Hemoglobin      11.7 - 15.7 g/dL 13.6   Hematocrit      35.0 - 47.0 % 41.7   MCV      78 - 100 fl 99   MCH      26.5 - 33.0 pg 32.2   MCHC      31.5 - 36.5 g/dL 32.6   RDW      10.0 - 15.0 % 13.3   Platelet Count      150 - 450 10e9/L 230   Protein Random Urine      g/L 0.07   Protein Total Urine g/gr Creatinine      0 -  0.2 g/g Cr 0.25 (H)   CRP Inflammation      0.0 - 8.0 mg/L 11.9 (H)   Sed Rate      0 - 30 mm/h 26   Creatinine Urine      mg/dL 27     Impression:    Polymyalgia rheumatica-with musculoskeletal pain and stiffness associated with a history of elevated inflammatory markers. Incomplete response to prednisone 10 mg daily at this point despite near normalization of inflammatory markers. New finding of +RNP antibody raises concern for chronic mixed connective tissue disease. I do not think that clinically this represents MCTD, in the absence of synovitis, hand pain, or RF. Nevertheless, she has not yet resolved her pain problems with low dose prednisone therapy, so this may represent an atypical presentation of connective tissue disease. Based on this I plan to re-initiate therapy PMR with 20 mg daily for 1 month and then taper by 5 mg daily each month thereafter. If this fails to have a durable therapeutic effect, then consider treatment with hydroxychloroquine and/or methotrexate.    Osteoarthritis-of the hands. This is stable.    Osteoporosis-she has a DEXA planned with her primary care provider per endocrinology recommendation. Monitor bone health with use of prednisone.    Long term management of immunosuppression-get laboratory testing today.

## 2019-05-15 ENCOUNTER — MYC MEDICAL ADVICE (OUTPATIENT)
Dept: ENDOCRINOLOGY | Facility: CLINIC | Age: 79
End: 2019-05-15

## 2019-05-16 NOTE — TELEPHONE ENCOUNTER
Please see VPEP message for this patient. I couldn't find the DEXA report. Can we please advise her to get it faxed to us.

## 2019-05-21 NOTE — TELEPHONE ENCOUNTER
Per Dr. Gonzalez:    Please advise patient to schedule follow up visit to discuss next steps regarding osteoporosis management.      Routing comment      Patient is scheduled for 8/6/19 at 1pm.    Shani Faulkner CMA  Adult Endocrinology  [facility]

## 2019-05-21 NOTE — TELEPHONE ENCOUNTER
RESULT LUMBAR SPINE L1-L4  BMD (gm/cm2) 1.113  T Score: -0.6       RESULT RIGHT FEMORAL NECK  BMD (gm/cm2) 0.829   T Score:  -1.5     RESULT RIGHT TOTAL HIP  BMD (gm/cm2) 0.877  T Score: -1.0     This patient's T-score meets the World Health Organization (WHO) criteria   for low bone density (osteopenia)at one or more measured sites (T-score   between less than -1.0 and greater than -2.5).   The risk of osteoporotic   fracture increases approximately two-fold for each 1.0 SD decrease in   T-score.    This patient's FRAX score for major osteoporotic fracture is 35.5 %.  This patient's FRAX score for hip fracture is 22.2 %.    The WHO Fracture Risk Assessment Tool calculates the 10 year probability   of fracture. This is for patients who are not being treated, to help in   the decision of when to treat.     RECOMMENDATIONS: The National Osteoporosis Foundation recommends   pharmacolgoic treatment for post-menopausal women and men >50 with:   1) prior hip or vertebral fracture (Fragility Type);   2) T-score at spine or hip -2.5 or lower; or   3) Total fracture risk is 20% or greater or hip fracture risk is 3% or   greater when applying FRAX.      Treatment for osteoporosis indicated based on DEXA scan report documented above and current use of chronic steroid therapy.  Please advise patient to schedule follow up visit to discuss next steps regarding osteoporosis management.

## 2019-07-19 ENCOUNTER — OFFICE VISIT (OUTPATIENT)
Dept: RHEUMATOLOGY | Facility: CLINIC | Age: 79
End: 2019-07-19
Payer: MEDICARE

## 2019-07-19 VITALS
HEIGHT: 63 IN | OXYGEN SATURATION: 97 % | WEIGHT: 136.4 LBS | HEART RATE: 54 BPM | RESPIRATION RATE: 14 BRPM | BODY MASS INDEX: 24.17 KG/M2 | DIASTOLIC BLOOD PRESSURE: 71 MMHG | SYSTOLIC BLOOD PRESSURE: 114 MMHG

## 2019-07-19 DIAGNOSIS — M81.0 AGE-RELATED OSTEOPOROSIS WITHOUT CURRENT PATHOLOGICAL FRACTURE: Chronic | ICD-10-CM

## 2019-07-19 DIAGNOSIS — M19.041 PRIMARY OSTEOARTHRITIS OF BOTH HANDS: Chronic | ICD-10-CM

## 2019-07-19 DIAGNOSIS — M35.3 PMR (POLYMYALGIA RHEUMATICA) (H): Chronic | ICD-10-CM

## 2019-07-19 DIAGNOSIS — R76.8 POSITIVE ANA (ANTINUCLEAR ANTIBODY): ICD-10-CM

## 2019-07-19 DIAGNOSIS — R00.2 PALPITATIONS: Primary | ICD-10-CM

## 2019-07-19 DIAGNOSIS — M35.3 POLYMYALGIA RHEUMATICA (H): ICD-10-CM

## 2019-07-19 DIAGNOSIS — M19.042 PRIMARY OSTEOARTHRITIS OF BOTH HANDS: Chronic | ICD-10-CM

## 2019-07-19 DIAGNOSIS — Z79.60 LONG-TERM USE OF IMMUNOSUPPRESSANT MEDICATION: ICD-10-CM

## 2019-07-19 LAB
ALBUMIN SERPL-MCNC: 3.8 G/DL (ref 3.4–5)
ALBUMIN UR-MCNC: NEGATIVE MG/DL
ALP SERPL-CCNC: 48 U/L (ref 40–150)
ALT SERPL W P-5'-P-CCNC: 30 U/L (ref 0–50)
ANION GAP SERPL CALCULATED.3IONS-SCNC: 4 MMOL/L (ref 3–14)
APPEARANCE UR: CLEAR
AST SERPL W P-5'-P-CCNC: 25 U/L (ref 0–45)
BILIRUB SERPL-MCNC: 0.6 MG/DL (ref 0.2–1.3)
BILIRUB UR QL STRIP: NEGATIVE
BUN SERPL-MCNC: 29 MG/DL (ref 7–30)
CALCIUM SERPL-MCNC: 9.5 MG/DL (ref 8.5–10.1)
CHLORIDE SERPL-SCNC: 106 MMOL/L (ref 94–109)
CO2 SERPL-SCNC: 29 MMOL/L (ref 20–32)
COLOR UR AUTO: NORMAL
CREAT SERPL-MCNC: 1.12 MG/DL (ref 0.52–1.04)
CREAT UR-MCNC: 30 MG/DL
CRP SERPL-MCNC: 3.6 MG/L (ref 0–8)
ERYTHROCYTE [DISTWIDTH] IN BLOOD BY AUTOMATED COUNT: 13 % (ref 10–15)
ERYTHROCYTE [SEDIMENTATION RATE] IN BLOOD BY WESTERGREN METHOD: 19 MM/H (ref 0–30)
GFR SERPL CREATININE-BSD FRML MDRD: 46 ML/MIN/{1.73_M2}
GLUCOSE SERPL-MCNC: 89 MG/DL (ref 70–99)
GLUCOSE UR STRIP-MCNC: NEGATIVE MG/DL
HCT VFR BLD AUTO: 39 % (ref 35–47)
HGB BLD-MCNC: 13.1 G/DL (ref 11.7–15.7)
HGB UR QL STRIP: NEGATIVE
KETONES UR STRIP-MCNC: NEGATIVE MG/DL
LEUKOCYTE ESTERASE UR QL STRIP: NEGATIVE
MCH RBC QN AUTO: 32.6 PG (ref 26.5–33)
MCHC RBC AUTO-ENTMCNC: 33.6 G/DL (ref 31.5–36.5)
MCV RBC AUTO: 97 FL (ref 78–100)
NITRATE UR QL: NEGATIVE
PH UR STRIP: 7 PH (ref 5–7)
PLATELET # BLD AUTO: 228 10E9/L (ref 150–450)
POTASSIUM SERPL-SCNC: 3.9 MMOL/L (ref 3.4–5.3)
PROT SERPL-MCNC: 7.4 G/DL (ref 6.8–8.8)
PROT UR-MCNC: <0.05 G/L
PROT/CREAT 24H UR: NORMAL G/G CR (ref 0–0.2)
RBC # BLD AUTO: 4.02 10E12/L (ref 3.8–5.2)
RBC #/AREA URNS AUTO: NORMAL /HPF
SODIUM SERPL-SCNC: 139 MMOL/L (ref 133–144)
SOURCE: NORMAL
SP GR UR STRIP: 1 (ref 1–1.03)
UROBILINOGEN UR STRIP-MCNC: NORMAL MG/DL (ref 0–2)
WBC # BLD AUTO: 9.5 10E9/L (ref 4–11)
WBC #/AREA URNS AUTO: NORMAL /HPF

## 2019-07-19 PROCEDURE — 99214 OFFICE O/P EST MOD 30 MIN: CPT | Performed by: INTERNAL MEDICINE

## 2019-07-19 PROCEDURE — 81001 URINALYSIS AUTO W/SCOPE: CPT | Performed by: INTERNAL MEDICINE

## 2019-07-19 PROCEDURE — 85652 RBC SED RATE AUTOMATED: CPT | Performed by: INTERNAL MEDICINE

## 2019-07-19 PROCEDURE — 84156 ASSAY OF PROTEIN URINE: CPT | Performed by: INTERNAL MEDICINE

## 2019-07-19 PROCEDURE — 86140 C-REACTIVE PROTEIN: CPT | Performed by: INTERNAL MEDICINE

## 2019-07-19 PROCEDURE — 85027 COMPLETE CBC AUTOMATED: CPT | Performed by: INTERNAL MEDICINE

## 2019-07-19 PROCEDURE — 36415 COLL VENOUS BLD VENIPUNCTURE: CPT | Performed by: INTERNAL MEDICINE

## 2019-07-19 PROCEDURE — 80053 COMPREHEN METABOLIC PANEL: CPT | Performed by: INTERNAL MEDICINE

## 2019-07-19 RX ORDER — PREDNISONE 5 MG/1
5 TABLET ORAL DAILY
Qty: 30 TABLET | Refills: 0 | Status: SHIPPED | OUTPATIENT
Start: 2019-07-19 | End: 2020-09-09

## 2019-07-19 RX ORDER — RISEDRONATE SODIUM 35 MG/1
35 TABLET, FILM COATED ORAL
COMMUNITY
Start: 2019-05-24

## 2019-07-19 ASSESSMENT — ROUTINE ASSESSMENT OF PATIENT INDEX DATA (RAPID3)
TOTAL RAPID3 SCORE: 5
RAPID3 INTERPRETATION: LOW 3.1-6

## 2019-07-19 ASSESSMENT — MIFFLIN-ST. JEOR: SCORE: 1057.84

## 2019-07-19 ASSESSMENT — PAIN SCALES - GENERAL: PAINLEVEL: MILD PAIN (2)

## 2019-07-19 NOTE — PATIENT INSTRUCTIONS
The following is a summary of your office visit:    Appointments made today: 3 month follow up with Dr. Beaulieu in [place].    Patient instructions: Please consult with cardiology regarding cardiac palpitations. Referral has been placed for this. You may follow up with cardiology closer to home in [place].      If you have had any blood work, imaging or other testing completed we will be in touch within 1-2 weeks regarding the results.     If you need refills please contact your pharmacy.     It was a pleasure meeting with you today. Please let us know if there is anything else we can do for you so that we can be sure you are leaving completely satisfied with your care experience.     If you have any questions, concerns or need to schedule a follow up, please contact us at [phone] and our fax [phone].    Your Rheumatology Team at [facility]

## 2019-07-19 NOTE — NURSING NOTE
"Libertad Sanchez's goals for this visit include: Return  She requests these members of her care team be copied on today's visit information: PCP    PCP: Barber Lopez    Referring Provider:  Barber Lopez MD  [address] [address]    /71   Pulse 54   Resp 14   Ht 1.6 m (5' 3\")   Wt 61.9 kg (136 lb 6.4 oz)   SpO2 97%   BMI 24.16 kg/m      Do you need any medication refills at today's visit? Y    "

## 2019-07-19 NOTE — PROGRESS NOTES
Ms. Sancehz returns for evaluation and management of PMR. +CHELSEY 1:320, +RNP, dsDNA-, Sm-, SSA/B-.    Her shoulder pain is now much improved. She reports only minor left shoulder pain with doing her hair or reaching into the cupboard. She denies any hip pain or weakness. She will occasionally use the treadmill, but this is limited by palpitations. No formal shoulder and hip exercise program. Energy is now good. AM stiffness is now nil.    No use of NSAIDs. She is currently on prednisone 10 mg daily for the past month.    PMI:  Medical-hypothyroidism, osteoporosis (T = -1.5 3-2019), empyema, carpal tunnel disease right, collagenous colitis, plantar fasciitis, eczema  Surgical-bunionectomy bilateral, carpal tunnel release, cataract surgery bilateral, right lung resection/pleural surgery/evacuation, tonsillectomy  Injuries-none    SH:  , previous  provider, 5 children, no tobacco or EtOH. Right handed.    FH:  Mother dead with alzheimer's  Father dead with MI  Sibs and children are healthy    PMSF history personally obtained and updated by me this visit.    ROS:  +palpitations recurring two weeks okay  +rare indigestion  +chronic rash  +easy brusing  +actonel and alendronate intolerant  Remainder of the 14 point ROS obtained and found negative.    Physical Exam:  Constitutional: WD-WN-WG cooperative  Eyes: nl EOM, PERRLA, vision, conjunctiva, sclera  ENT: nl external ears, nose, hearing, lips, teeth, gums, throat  Neck: no mass or thyroid enlargement  Resp: lungs clear to auscultation, nl to palpation, nl effort  CV: RRR, no murmurs, rubs or gallops, no edema  GI: no ABD mass or tenderness, no HSM  : not tested  Lymph: no cervical or epitrochlear nodes  MS: +diffuse heberden's nodes and right PIP 2,3 perez's nodes; subtle 1st CMC squaring and thumb Z-laxity; normal  and prayer; reduced tuck with OA deformities; neck rotation limited to 30 degrees right and 45 degrees left with slightly reduced  flexion and extension; Right knee with trace swelling; bilateral knee flexion to 120 degrees; All other TMJ, neck, shoulder, elbow, wrist, hand, spine, hip, knee, ankle, and foot joints were examined and otherwise found normal. No active synovitis.  Skin: no nail pitting, alopecia, rash  Neuro: nl cranial nerves, strength, sensation, DTRs.   Psych: nl judgement, orientation, memory, affect.    Laboratory:    Component      Latest Ref Rng & Units 7/19/2019   Sodium      133 - 144 mmol/L 139   Potassium      3.4 - 5.3 mmol/L 3.9   Chloride      94 - 109 mmol/L 106   Carbon Dioxide      20 - 32 mmol/L 29   Anion Gap      3 - 14 mmol/L 4   Glucose      70 - 99 mg/dL 89   Urea Nitrogen      7 - 30 mg/dL 29   Creatinine      0.52 - 1.04 mg/dL 1.12 (H)   GFR Estimate      >60 mL/min/1.73:m2 46 (L)   GFR Estimate If Black      >60 mL/min/1.73:m2 54 (L)   Calcium      8.5 - 10.1 mg/dL 9.5   Bilirubin Total      0.2 - 1.3 mg/dL 0.6   Albumin      3.4 - 5.0 g/dL 3.8   Protein Total      6.8 - 8.8 g/dL 7.4   Alkaline Phosphatase      40 - 150 U/L 48   ALT      0 - 50 U/L 30   AST      0 - 45 U/L 25   Color Urine       Light Yellow   Appearance Urine       Clear   Glucose Urine      NEG:Negative mg/dL Negative   Bilirubin Urine      NEG:Negative Negative   Ketones Urine      NEG:Negative mg/dL Negative   Specific Gravity Urine      1.003 - 1.035 1.005   Blood Urine      NEG:Negative Negative   pH Urine      5.0 - 7.0 pH 7.0   Protein Albumin Urine      NEG:Negative mg/dL Negative   Urobilinogen mg/dL      0.0 - 2.0 mg/dL Normal   Nitrite Urine      NEG:Negative Negative   Leukocyte Esterase Urine      NEG:Negative Negative   Source       Midstream Urine   WBC Urine      OTO5:0 - 5 /HPF 0 - 5   RBC Urine      OTO2:O - 2 /HPF O - 2   WBC      4.0 - 11.0 10e9/L 9.5   RBC Count      3.8 - 5.2 10e12/L 4.02   Hemoglobin      11.7 - 15.7 g/dL 13.1   Hematocrit      35.0 - 47.0 % 39.0   MCV      78 - 100 fl 97   MCH      26.5 - 33.0 pg  32.6   MCHC      31.5 - 36.5 g/dL 33.6   RDW      10.0 - 15.0 % 13.0   Platelet Count      150 - 450 10e9/L 228   CRP Inflammation      0.0 - 8.0 mg/L 3.6   Sed Rate      0 - 30 mm/h 19     Impression:    Polymyalgia rheumatica-this is now resolving nicely with no signs of disease activity. Based on this we will reduce the prednisone to 5 mg daily for one month and and then stop this. Plan repeat ESR and CRP in 3 months.    Osteoarthritis-of the hands. This is stable.    Palpitations-with abnormal EKG but echo was unremarkable. I will refer to cardiology to evaluate further.    Osteoporosis-with DEXA indicating osteopenia. She is now being treated with actonel.    Long term management of immunosuppression-with stable lab testing.     RTC in 3 months.

## 2019-07-19 NOTE — LETTER
7/19/2019        RE: Libertad Sanchez  [address] [place] [id number]        Ms. Sanchez returns for evaluation and management of PMR. +CHELSEY 1:320, +RNP, dsDNA-, Sm-, SSA/B-.    Her shoulder pain is now much improved. She reports only minor left shoulder pain with doing her hair or reaching into the cupboard. She denies any hip pain or weakness. She will occasionally use the treadmill, but this is limited by palpitations. No formal shoulder and hip exercise program. Energy is now good. AM stiffness is now nil.    No use of NSAIDs. She is currently on prednisone 10 mg daily for the past month.    PMI:  Medical-hypothyroidism, osteoporosis (T = -1.5 3-2019), empyema, carpal tunnel disease right, collagenous colitis, plantar fasciitis, eczema  Surgical-bunionectomy bilateral, carpal tunnel release, cataract surgery bilateral, right lung resection/pleural surgery/evacuation, tonsillectomy  Injuries-none    SH:  , previous  provider, 5 children, no tobacco or EtOH. Right handed.    FH:  Mother dead with alzheimer's  Father dead with MI  Sibs and children are healthy    PMSF history personally obtained and updated by me this visit.    ROS:  +palpitations recurring two weeks okay  +rare indigestion  +chronic rash  +easy brusing  +actonel and alendronate intolerant  Remainder of the 14 point ROS obtained and found negative.    Physical Exam:  Constitutional: WD-WN-WG cooperative  Eyes: nl EOM, PERRLA, vision, conjunctiva, sclera  ENT: nl external ears, nose, hearing, lips, teeth, gums, throat  Neck: no mass or thyroid enlargement  Resp: lungs clear to auscultation, nl to palpation, nl effort  CV: RRR, no murmurs, rubs or gallops, no edema  GI: no ABD mass or tenderness, no HSM  : not tested  Lymph: no cervical or epitrochlear nodes  MS: +diffuse heberden's nodes and right PIP 2,3 perez's nodes; subtle 1st CMC squaring and thumb Z-laxity; normal  and prayer; reduced tuck with OA deformities;  neck rotation limited to 30 degrees right and 45 degrees left with slightly reduced flexion and extension; Right knee with trace swelling; bilateral knee flexion to 120 degrees; All other TMJ, neck, shoulder, elbow, wrist, hand, spine, hip, knee, ankle, and foot joints were examined and otherwise found normal. No active synovitis.  Skin: no nail pitting, alopecia, rash  Neuro: nl cranial nerves, strength, sensation, DTRs.   Psych: nl judgement, orientation, memory, affect.    Laboratory:    Component      Latest Ref Rng & Units 7/19/2019   Sodium      133 - 144 mmol/L 139   Potassium      3.4 - 5.3 mmol/L 3.9   Chloride      94 - 109 mmol/L 106   Carbon Dioxide      20 - 32 mmol/L 29   Anion Gap      3 - 14 mmol/L 4   Glucose      70 - 99 mg/dL 89   Urea Nitrogen      7 - 30 mg/dL 29   Creatinine      0.52 - 1.04 mg/dL 1.12 (H)   GFR Estimate      >60 mL/min/1.73:m2 46 (L)   GFR Estimate If Black      >60 mL/min/1.73:m2 54 (L)   Calcium      8.5 - 10.1 mg/dL 9.5   Bilirubin Total      0.2 - 1.3 mg/dL 0.6   Albumin      3.4 - 5.0 g/dL 3.8   Protein Total      6.8 - 8.8 g/dL 7.4   Alkaline Phosphatase      40 - 150 U/L 48   ALT      0 - 50 U/L 30   AST      0 - 45 U/L 25   Color Urine       Light Yellow   Appearance Urine       Clear   Glucose Urine      NEG:Negative mg/dL Negative   Bilirubin Urine      NEG:Negative Negative   Ketones Urine      NEG:Negative mg/dL Negative   Specific Gravity Urine      1.003 - 1.035 1.005   Blood Urine      NEG:Negative Negative   pH Urine      5.0 - 7.0 pH 7.0   Protein Albumin Urine      NEG:Negative mg/dL Negative   Urobilinogen mg/dL      0.0 - 2.0 mg/dL Normal   Nitrite Urine      NEG:Negative Negative   Leukocyte Esterase Urine      NEG:Negative Negative   Source       Midstream Urine   WBC Urine      OTO5:0 - 5 /HPF 0 - 5   RBC Urine      OTO2:O - 2 /HPF O - 2   WBC      4.0 - 11.0 10e9/L 9.5   RBC Count      3.8 - 5.2 10e12/L 4.02   Hemoglobin      11.7 - 15.7 g/dL 13.1    Hematocrit      35.0 - 47.0 % 39.0   MCV      78 - 100 fl 97   MCH      26.5 - 33.0 pg 32.6   MCHC      31.5 - 36.5 g/dL 33.6   RDW      10.0 - 15.0 % 13.0   Platelet Count      150 - 450 10e9/L 228   CRP Inflammation      0.0 - 8.0 mg/L 3.6   Sed Rate      0 - 30 mm/h 19     Impression:    Polymyalgia rheumatica-this is now resolving nicely with no signs of disease activity. Based on this we will reduce the prednisone to 5 mg daily for one month and and then stop this. Plan repeat ESR and CRP in 3 months.    Osteoarthritis-of the hands. This is stable.    Palpitations-with abnormal EKG but echo was unremarkable. I will refer to cardiology to evaluate further.    Osteoporosis-with DEXA indicating osteopenia. She is now being treated with actonel.    Long term management of immunosuppression-with stable lab testing.     RTC in 3 months.                  Sincerely,        Allan Beaulieu MD

## 2019-10-01 ENCOUNTER — HEALTH MAINTENANCE LETTER (OUTPATIENT)
Age: 79
End: 2019-10-01

## 2019-10-18 ENCOUNTER — ANCILLARY PROCEDURE (OUTPATIENT)
Dept: GENERAL RADIOLOGY | Facility: CLINIC | Age: 79
End: 2019-10-18
Attending: INTERNAL MEDICINE
Payer: MEDICARE

## 2019-10-18 ENCOUNTER — TELEPHONE (OUTPATIENT)
Dept: RHEUMATOLOGY | Facility: CLINIC | Age: 79
End: 2019-10-18

## 2019-10-18 ENCOUNTER — OFFICE VISIT (OUTPATIENT)
Dept: RHEUMATOLOGY | Facility: CLINIC | Age: 79
End: 2019-10-18
Payer: MEDICARE

## 2019-10-18 VITALS
HEART RATE: 57 BPM | HEIGHT: 63 IN | DIASTOLIC BLOOD PRESSURE: 76 MMHG | WEIGHT: 136 LBS | OXYGEN SATURATION: 100 % | BODY MASS INDEX: 24.1 KG/M2 | SYSTOLIC BLOOD PRESSURE: 130 MMHG | RESPIRATION RATE: 18 BRPM

## 2019-10-18 DIAGNOSIS — M35.3 POLYMYALGIA RHEUMATICA (H): ICD-10-CM

## 2019-10-18 DIAGNOSIS — M35.3 PMR (POLYMYALGIA RHEUMATICA) (H): Chronic | ICD-10-CM

## 2019-10-18 DIAGNOSIS — R76.8 POSITIVE ANA (ANTINUCLEAR ANTIBODY): ICD-10-CM

## 2019-10-18 DIAGNOSIS — M19.041 PRIMARY OSTEOARTHRITIS OF BOTH HANDS: Chronic | ICD-10-CM

## 2019-10-18 DIAGNOSIS — M81.0 AGE-RELATED OSTEOPOROSIS WITHOUT CURRENT PATHOLOGICAL FRACTURE: Chronic | ICD-10-CM

## 2019-10-18 DIAGNOSIS — R00.2 PALPITATIONS: ICD-10-CM

## 2019-10-18 DIAGNOSIS — Z79.60 LONG-TERM USE OF IMMUNOSUPPRESSANT MEDICATION: ICD-10-CM

## 2019-10-18 DIAGNOSIS — M19.042 PRIMARY OSTEOARTHRITIS OF BOTH HANDS: Chronic | ICD-10-CM

## 2019-10-18 LAB
CRP SERPL-MCNC: 5.3 MG/L (ref 0–8)
ERYTHROCYTE [SEDIMENTATION RATE] IN BLOOD BY WESTERGREN METHOD: 17 MM/H (ref 0–30)

## 2019-10-18 PROCEDURE — 73030 X-RAY EXAM OF SHOULDER: CPT | Mod: LT | Performed by: RADIOLOGY

## 2019-10-18 PROCEDURE — 86140 C-REACTIVE PROTEIN: CPT | Performed by: INTERNAL MEDICINE

## 2019-10-18 PROCEDURE — 36415 COLL VENOUS BLD VENIPUNCTURE: CPT | Performed by: INTERNAL MEDICINE

## 2019-10-18 PROCEDURE — 99214 OFFICE O/P EST MOD 30 MIN: CPT | Performed by: INTERNAL MEDICINE

## 2019-10-18 PROCEDURE — 85652 RBC SED RATE AUTOMATED: CPT | Performed by: INTERNAL MEDICINE

## 2019-10-18 RX ORDER — PREDNISONE 5 MG/1
5 TABLET ORAL DAILY
Qty: 30 TABLET | Refills: 3 | Status: CANCELLED | OUTPATIENT
Start: 2019-10-18

## 2019-10-18 ASSESSMENT — PAIN SCALES - GENERAL: PAINLEVEL: MODERATE PAIN (5)

## 2019-10-18 ASSESSMENT — ROUTINE ASSESSMENT OF PATIENT INDEX DATA (RAPID3)
RAPID3 INTERPRETATION: MODERATE 6.1-12.0
TOTAL RAPID3 SCORE: 12

## 2019-10-18 ASSESSMENT — MIFFLIN-ST. JEOR: SCORE: 1056.02

## 2019-10-18 NOTE — PATIENT INSTRUCTIONS
If you have had any blood work, imaging or other testing completed we will be in touch within 1-2 weeks regarding the results.     If you need refills please contact your pharmacy.     It was a pleasure meeting with you today. Please let us know if there is anything else we can do for you so that we can be sure you are leaving completely satisfied with your care experience.     If you have any questions, concerns or need to schedule a follow up, please contact us at [phone] and our fax [phone].    Your Rheumatology Team at [facility]

## 2019-10-18 NOTE — NURSING NOTE
"Libertad Sanchez's goals for this visit include: Return  She requests these members of her care team be copied on today's visit information: PCP    PCP: Barber Lopez    Referring Provider:  No referring provider defined for this encounter.    /76 (BP Location: Right arm, Patient Position: Sitting, Cuff Size: Adult Regular)   Pulse 57   Resp 18   Ht 1.6 m (5' 3\")   Wt 61.7 kg (136 lb)   SpO2 100%   BMI 24.09 kg/m      Do you need any medication refills at today's visit? Y    "

## 2019-10-18 NOTE — TELEPHONE ENCOUNTER
Per the patient's request faxed PT referral to [facility], fax is [phone]; clinic number [phone].    Thank you.    Maryana SCHWARTZ [facility]  Adult Med Spec/Surg Spec   [phone]

## 2019-10-18 NOTE — LETTER
10/18/2019        RE: Libertad Sanchez  [address] [place] [id number]        Ms. Sanchez returns for evaluation and management of PMR. +CHELSEY 1:320, +RNP, dsDNA-, Sm-, SSA/B-. Previously treated with prednisone.    She complains today of continued pain. This is most prominent in the morning but does last all day. The muscles feel like they are burning in her upper legs. Her left shoulder hurts her during night. The left shoulder is painful with abduction, but the right is normal. She has lost strength in the left shoulder. She also has pain in the quadriceps with standing. No real LE weakness. She has pedal edema in the evening, but not really localized to the joints.     Prednisone is now discontinued since late August. She will use acetaminophen 1,000 at night time and maybe another time during the day. Ibuprofen 200 mg at night for pain.    She has fatigue, but she relates this to constant care giving at home. AM stiffness is 90 minutes. She denies palpitations, chest pains or palpitations.     She feels that she can't go to physical therapy because she can't get help to take care of her  during the day.    PMI:  Medical-hypothyroidism, osteoporosis (T = -1.5 3-2019), empyema, carpal tunnel disease right, collagenous colitis, plantar fasciitis, eczema  Surgical-bunionectomy bilateral, carpal tunnel release, cataract surgery bilateral, right lung resection/pleural surgery/evacuation, tonsillectomy  Injuries-none    SH:  , previous  provider, 5 children, no tobacco or EtOH. Right handed.    FH:  Mother dead with alzheimer's  Father dead with MI  Sibs and children are healthy    PMSF history personally obtained and updated by me this visit.    ROS:  +bone aching  +actonel and alendronate intolerant  Remainder of the 14 point ROS obtained and found negative.    Physical Exam:  Constitutional: WD-WN-WG cooperative  Eyes: nl EOM, PERRLA, vision, conjunctiva, sclera  ENT: nl external ears,  nose, hearing, lips, teeth, gums, throat  Neck: no mass or thyroid enlargement  Resp: lungs clear to auscultation, nl to palpation, nl effort  CV: RRR, no murmurs, rubs or gallops, no edema  GI: no ABD mass or tenderness, no HSM  : not tested  Lymph: no cervical or epitrochlear nodes  MS: +diffuse heberden's nodes and right PIP 2,3 perez's nodes; subtle 1st CMC squaring and thumb Z-laxity; normal  but right hand prayer sign and reduced tuck with OA deformities; left shoulder with limited ABduction to 90 degrees and limited rotation; neck rotation limited to 30 degrees right and 45 degrees left with slightly reduced flexion and extension; Right knee with trace swelling; bilateral knee flexion to 120 degrees; All other TMJ, neck, shoulder, elbow, wrist, hand, spine, hip, knee, ankle, and foot joints were examined and otherwise found normal. No active synovitis.  Skin: no nail pitting, alopecia, rash  Neuro: nl cranial nerves, strength, sensation, DTRs; +left shoulder weakness 4/5  Psych: nl judgement, orientation, memory, affect.    Laboratory:    10/18/19  8:45 AM [id number]    Component Value Flag Ref Range Units Status Collected Lab   CRP Inflammation 5.3   0.0 - 8.0 mg/L Final 10/18/2019  8:45 AM      Exam Date Exam Time Accession # Results    10/18/19  8:45 AM [id number]    Component Value Flag Ref Range Units Status Collected Lab   Sed Rate 17   0 - 30 mm/h Final 10/18/2019  8:45 AM      Study Result     2 views leftshoulder radiographs 10/18/2019 10:40 AM     History: Positive CHELSEY (antinuclear antibody); Polymyalgia rheumatica  (H); PMR (polymyalgia rheumatica) (H); Primary osteoarthritis of both  hands; Primary osteoarthritis of both hands     Comparison: None     Findings:     AP and transscapular Y views of the left shoulder were obtained.      No acute osseous abnormality. Glenohumeral and acromioclavicular  joints are congruent.     There is joint space narrowing with osteophytosis of the  glenohumeral  joint. There is mild degenerative changes of the AC joint.     Soft tissue is unremarkable. The visualized lung is clear.                                                                      Impression:  1. No acute osseous abnormality.  2. Moderate glenohumeral joint degenerative changes.     I have personally reviewed the examination and initial interpretation  and I agree with the findings.     JUTTA ELLERMANN, MD       Impression:    Polymyalgia rheumatica-this is now resolved in my opinion. Her inflammatory markers are nil and her symptoms are now localized to her left shoulder.  I find no important indication for prednisone therapy.    Osteoarthritis-of the hands and left shoulder. Now with left shoulder pain that may relate to her underlying OA, or alternatively previous to her previous PMR, and now adhesive capsulitis and deconditioning. I think that physical therapy is most needed to mobilize this shoulder and to instruct her in strengthening exercises. Referral to orthopedics for long term joint care of shoulder DJD. Recommend that she increase the acetaminophen to 1,000 mg BID and I will allow rare use of ibuprofen.    Palpitations-with abnormal EKG. This has now resolved.     RTC prn.                  Sincerely,        Allan Beaulieu MD

## 2019-10-18 NOTE — PROGRESS NOTES
Ms. Sanchez returns for evaluation and management of PMR. +CHELSEY 1:320, +RNP, dsDNA-, Sm-, SSA/B-. Previously treated with prednisone.    She complains today of continued pain. This is most prominent in the morning but does last all day. The muscles feel like they are burning in her upper legs. Her left shoulder hurts her during night. The left shoulder is painful with abduction, but the right is normal. She has lost strength in the left shoulder. She also has pain in the quadriceps with standing. No real LE weakness. She has pedal edema in the evening, but not really localized to the joints.     Prednisone is now discontinued since late August. She will use acetaminophen 1,000 at night time and maybe another time during the day. Ibuprofen 200 mg at night for pain.    She has fatigue, but she relates this to constant care giving at home. AM stiffness is 90 minutes. She denies palpitations, chest pains or palpitations.     She feels that she can't go to physical therapy because she can't get help to take care of her  during the day.    PMI:  Medical-hypothyroidism, osteoporosis (T = -1.5 3-2019), empyema, carpal tunnel disease right, collagenous colitis, plantar fasciitis, eczema  Surgical-bunionectomy bilateral, carpal tunnel release, cataract surgery bilateral, right lung resection/pleural surgery/evacuation, tonsillectomy  Injuries-none    SH:  , previous  provider, 5 children, no tobacco or EtOH. Right handed.    FH:  Mother dead with alzheimer's  Father dead with MI  Sibs and children are healthy    PMSF history personally obtained and updated by me this visit.    ROS:  +bone aching  +actonel and alendronate intolerant  Remainder of the 14 point ROS obtained and found negative.    Physical Exam:  Constitutional: WD-WN-WG cooperative  Eyes: nl EOM, PERRLA, vision, conjunctiva, sclera  ENT: nl external ears, nose, hearing, lips, teeth, gums, throat  Neck: no mass or thyroid enlargement  Resp:  lungs clear to auscultation, nl to palpation, nl effort  CV: RRR, no murmurs, rubs or gallops, no edema  GI: no ABD mass or tenderness, no HSM  : not tested  Lymph: no cervical or epitrochlear nodes  MS: +diffuse heberden's nodes and right PIP 2,3 perez's nodes; subtle 1st CMC squaring and thumb Z-laxity; normal  but right hand prayer sign and reduced tuck with OA deformities; left shoulder with limited ABduction to 90 degrees and limited rotation; neck rotation limited to 30 degrees right and 45 degrees left with slightly reduced flexion and extension; Right knee with trace swelling; bilateral knee flexion to 120 degrees; All other TMJ, neck, shoulder, elbow, wrist, hand, spine, hip, knee, ankle, and foot joints were examined and otherwise found normal. No active synovitis.  Skin: no nail pitting, alopecia, rash  Neuro: nl cranial nerves, strength, sensation, DTRs; +left shoulder weakness 4/5  Psych: nl judgement, orientation, memory, affect.    Laboratory:    10/18/19  8:45 AM [id number]    Component Value Flag Ref Range Units Status Collected Lab   CRP Inflammation 5.3   0.0 - 8.0 mg/L Final 10/18/2019  8:45 AM      Exam Date Exam Time Accession # Results    10/18/19  8:45 AM [id number]    Component Value Flag Ref Range Units Status Collected Lab   Sed Rate 17   0 - 30 mm/h Final 10/18/2019  8:45 AM      Study Result     2 views leftshoulder radiographs 10/18/2019 10:40 AM     History: Positive CHELSEY (antinuclear antibody); Polymyalgia rheumatica  (H); PMR (polymyalgia rheumatica) (H); Primary osteoarthritis of both  hands; Primary osteoarthritis of both hands     Comparison: None     Findings:     AP and transscapular Y views of the left shoulder were obtained.      No acute osseous abnormality. Glenohumeral and acromioclavicular  joints are congruent.     There is joint space narrowing with osteophytosis of the glenohumeral  joint. There is mild degenerative changes of the AC joint.     Soft tissue is  unremarkable. The visualized lung is clear.                                                                      Impression:  1. No acute osseous abnormality.  2. Moderate glenohumeral joint degenerative changes.     I have personally reviewed the examination and initial interpretation  and I agree with the findings.     JUTTA ELLERMANN, MD       Impression:    Polymyalgia rheumatica-this is now resolved in my opinion. Her inflammatory markers are nil and her symptoms are now localized to her left shoulder.  I find no important indication for prednisone therapy.    Osteoarthritis-of the hands and left shoulder. Now with left shoulder pain that may relate to her underlying OA, or alternatively previous to her previous PMR, and now adhesive capsulitis and deconditioning. I think that physical therapy is most needed to mobilize this shoulder and to instruct her in strengthening exercises. Referral to orthopedics for long term joint care of shoulder DJD. Recommend that she increase the acetaminophen to 1,000 mg BID and I will allow rare use of ibuprofen.    Palpitations-with abnormal EKG. This has now resolved.     RTC prn.

## 2019-12-15 ENCOUNTER — HEALTH MAINTENANCE LETTER (OUTPATIENT)
Age: 79
End: 2019-12-15

## 2020-02-03 ENCOUNTER — TRANSFERRED RECORDS (OUTPATIENT)
Dept: HEALTH INFORMATION MANAGEMENT | Facility: CLINIC | Age: 80
End: 2020-02-03

## 2020-02-03 LAB
ALT SERPL-CCNC: 17 IU/L (ref 8–45)
AST SERPL-CCNC: 21 IU/L (ref 2–40)
CREAT SERPL-MCNC: 1.14 MG/DL (ref 0.57–1.11)
GFR SERPL CREATININE-BSD FRML MDRD: 46 ML/MIN/1.73M2
GLUCOSE SERPL-MCNC: 83 MG/DL (ref 65–100)
POTASSIUM SERPL-SCNC: 4.9 MMOL/L (ref 3.5–5)
TSH SERPL-ACNC: 3.32 UIU/ML (ref 0.35–4.94)

## 2020-02-08 ENCOUNTER — TELEPHONE (OUTPATIENT)
Dept: RHEUMATOLOGY | Facility: CLINIC | Age: 80
End: 2020-02-08

## 2020-02-08 NOTE — TELEPHONE ENCOUNTER
Reason for call:  Other   Patient called regarding (reason for call): appointment  Additional comments: Please call back on Monday    Phone number to reach patient:  Home number on file [phone] (home)    Best Time:  any    Can we leave a detailed message on this number?  YES

## 2020-02-10 NOTE — TELEPHONE ENCOUNTER
Spoke with patient. She wanted to confirm that her labs were available to Dr. Beaulieu. Let her know that we did have them.     CHACHO MoranN, RN   Rheumatology Care Coordinator   [facility]

## 2020-02-11 ENCOUNTER — TELEPHONE (OUTPATIENT)
Dept: RHEUMATOLOGY | Facility: CLINIC | Age: 80
End: 2020-02-11

## 2020-02-11 NOTE — TELEPHONE ENCOUNTER
[facility] Call Center    Phone Message    May a detailed message be left on voicemail: yes     Reason for Call: The patient is requesting a call to confirm that Dr. Beaulieu received her recent labs that she had faxed from a [place] lab and to discuss if she needs more labs prior to seeing Dr. Beaulieu.  Please advise. Thank you.     Action Taken: Message routed to:  Adult Clinics: Rheumatology p [id number]    Travel Screening: Not Applicable

## 2020-02-14 ENCOUNTER — OFFICE VISIT (OUTPATIENT)
Dept: RHEUMATOLOGY | Facility: CLINIC | Age: 80
End: 2020-02-14
Payer: MEDICARE

## 2020-02-14 VITALS
OXYGEN SATURATION: 96 % | RESPIRATION RATE: 18 BRPM | SYSTOLIC BLOOD PRESSURE: 119 MMHG | HEART RATE: 59 BPM | WEIGHT: 138 LBS | DIASTOLIC BLOOD PRESSURE: 71 MMHG | BODY MASS INDEX: 24.45 KG/M2

## 2020-02-14 DIAGNOSIS — M81.0 AGE-RELATED OSTEOPOROSIS WITHOUT CURRENT PATHOLOGICAL FRACTURE: Chronic | ICD-10-CM

## 2020-02-14 DIAGNOSIS — R76.8 POSITIVE ANA (ANTINUCLEAR ANTIBODY): ICD-10-CM

## 2020-02-14 DIAGNOSIS — M19.042 PRIMARY OSTEOARTHRITIS OF BOTH HANDS: Chronic | ICD-10-CM

## 2020-02-14 DIAGNOSIS — M35.3 PMR (POLYMYALGIA RHEUMATICA) (H): Chronic | ICD-10-CM

## 2020-02-14 DIAGNOSIS — M19.041 PRIMARY OSTEOARTHRITIS OF BOTH HANDS: Chronic | ICD-10-CM

## 2020-02-14 DIAGNOSIS — Z79.60 LONG-TERM USE OF IMMUNOSUPPRESSANT MEDICATION: ICD-10-CM

## 2020-02-14 DIAGNOSIS — M35.3 POLYMYALGIA RHEUMATICA (H): ICD-10-CM

## 2020-02-14 DIAGNOSIS — R00.2 PALPITATIONS: ICD-10-CM

## 2020-02-14 PROCEDURE — 99215 OFFICE O/P EST HI 40 MIN: CPT | Performed by: INTERNAL MEDICINE

## 2020-02-14 RX ORDER — PREDNISONE 5 MG/1
TABLET ORAL
Qty: 30 TABLET | Refills: 2 | Status: SHIPPED | OUTPATIENT
Start: 2020-02-14 | End: 2020-04-10

## 2020-02-14 ASSESSMENT — ROUTINE ASSESSMENT OF PATIENT INDEX DATA (RAPID3)
TOTAL RAPID3 SCORE: 25.7
RAPID3 INTERPRETATION: HIGH > 12.0

## 2020-02-14 ASSESSMENT — PAIN SCALES - GENERAL: PAINLEVEL: MODERATE PAIN (5)

## 2020-02-14 NOTE — LETTER
2/14/2020        RE: Libertad Sanchez  [address] [place] [id number]        Ms. Sanchez returns for evaluation and management of PMR. +CHELSEY, +RNP, dsDNA-, Sm-, SSA/B-. Previously treated with prednisone.    In January she developed total body pain and fatigue. She also had rash on her abdomen that was associated with itching.    She was treated with prednisone 40 mg with taper over 10 days. This cleared the rash, pain and weakness within 1 day. Today was her last day of 10 mg of prednisone. Today she feels normal.    There was no swelling, redness, or warmth of joints. She had fatigue and weakness that limited her ability to lift objects and walk. She was dosing off in the chair more than usual.    No oral ulcers, chest pains, or alopecia. She has stable dry mouth. Energy is normal today. AM stiffness was 1 hour.     PMI:  Medical-hypothyroidism, osteoporosis (T = -1.5 3-2019), empyema, carpal tunnel disease right, collagenous colitis, plantar fasciitis, eczema  Surgical-bunionectomy bilateral, carpal tunnel release, cataract surgery bilateral, right lung resection/pleural surgery/evacuation, tonsillectomy  Injuries-none    SH:  , previous  provider, 5 children, no tobacco or EtOH. Right handed.    FH:  Mother dead with alzheimer's  Father dead with MI  Sibs and children are healthy    PMSF history personally obtained and updated by me this visit.    ROS:  +palpitations were present on occasion  +actonel and alendronate intolerant  Remainder of the 14 point ROS obtained and found negative.    Physical Exam:  Constitutional: WD-WN-WG cooperative  Eyes: nl EOM, PERRLA, vision, conjunctiva, sclera  ENT: nl external ears, nose, hearing, lips, teeth, gums, throat  Neck: no mass or thyroid enlargement  Resp: lungs clear to auscultation, nl to palpation, nl effort  CV: RRR, no murmurs, rubs or gallops, no edema  GI: no ABD mass or tenderness, no HSM  : not tested  Lymph: no cervical or epitrochlear  nodes  MS: +diffuse heberden's nodes and right PIP 2,3 perez's nodes; subtle 1st CMC squaring and thumb Z-laxity; normal  but right hand prayer sign and reduced tuck with OA deformities; neck rotation limited to 30 degrees right and 45 degrees left with slightly reduced flexion and extension; Right knee with trace swelling; bilateral knee flexion to 120 degrees; All other TMJ, neck, shoulder, elbow, wrist, hand, spine, hip, knee, ankle, and foot joints were examined and otherwise found normal. No active synovitis.  Skin: no nail pitting, alopecia, rash; +healing excoriations on the abdomen skin  Neuro: nl cranial nerves, strength, sensation, DTRs  Psych: nl judgement, orientation, memory, affect.    Laboratory:    Exam Date Exam Time Accession # Results    2/3/20  3:10 PM     Contains abnormal data COMP METABOLIC PANEL   Order: [id number]   (suggestion)  Information displayed in this report will not trend or trigger automated decision support.    Ref Range & Units Value   SODIUM 135 - 145 mmol/L 139    POTASSIUM 3.5 - 5.0 mmol/L 4.9    CHLORIDE 98 - 110 mmol/L 104    CO2,TOTAL 21 - 31 mmol/L 28    ANION GAP 5 - 18  7    GLUCOSE 65 - 100 mg/dL 83    CALCIUM 8.5 - 10.5 mg/dL 9.7    BUN 8 - 25 mg/dL 26High     CREATININE 0.57 - 1.11 mg/dL 1.14High     BUN/CREAT RATIO           10 - 20  23High     GFR if African American >60 ml/min/1.73m2 56Low     GFR if not African American >60 ml/min/1.73m2 46Low     ALBUMIN 3.2 - 4.6 g/dL 4.0    PROTEIN,TOTAL 6.0 - 8.0 g/dL 7.2    GLOBULIN                  2.0 - 3.7 g/dL 3.2    A/G RATIO 1.0 - 2.0  1.3    BILIRUBIN,TOTAL 0.2 - 1.2 mg/dL 0.4    ALK PHOSPHATASE 50 - 136 IU/L 69    ALT (SGPT) 8 - 45 IU/L 17    AST (SGOT) 2 - 40 IU/L 21    Resulting Agency  [facility] LABORATORY      Ref Range & Units Value   C-REACTIVE PROTEIN        <0.50 mg/dL 0.36    Resulting Agency        Ref Range & Units Value   CK,TOTAL 29 - 168 IU/L 100       Ref Range & Units Value    WHITE BLOOD COUNT         4.5 - 11.0 thou/cu mm 8.1    RED BLOOD COUNT           4.00 - 5.20 mil/cu mm 4.10    HEMOGLOBIN                12.0 - 16.0 g/dL 13.3    HEMATOCRIT                33.0 - 51.0 % 40.2    MCV                       80 - 100 fL 98    MCH                       26.0 - 34.0 pg 32.4    MCHC                      32.0 - 36.0 g/dL 33.1    RDW                       11.5 - 15.5 % 13.0    PLATELET COUNT            140 - 440 thou/cu mm 274    MPV                       6.5 - 11.0 fL 9.5    NEUTROPHILS                % 62.9    LYMPHOCYTES                % 23.1    MONOCYTES                  % 10.7    EOSINOPHILS                % 2.6    BASOPHILS                  % 0.7    ABSOLUTE NEUTROPHILS      1.7 - 7.0 thou/cu mm 5.1    ABSOLUTE LYMPHOCYTES      0.9 - 2.9 thou/cu mm 1.9    ABSOLUTE MONOCYTES        <0.9 thou/cu mm 0.9High     ABSOLUTE EOSINOPHILS      <0.5 thou/cu mm 0.2    ABSOLUTE BASOPHILS        <0.3 thou/cu mm 0.1    Resulting Agency        Ref Range & Units Value   ANTINUCLEAR ANTIBODY (CHELSEY) Negative  PositiveAbnormal     CHELSEY PATTERN 1 (none)  HomogenousAbnormal     CHELSEY TITER 1 (none)  1:640Abnormal        Ref Range & Units Value   SEDIMENTATION RATE        <31 mm/hr 20        Impression:    Polymyalgia rheumatica-by history with excellent response to corticosteroid therapy. This episode was complicated by rash of uncertain etiology, and her systemic inflammatory markers remained low. Thus, the diagnosis is not entirely clear. Nonetheless, she is improved and has tolerated the prednisone well. Based on this we agree to continue low dose prednisone 5 mg daily for 1 month, then 2.5 mg daily for one month. Next visit consider discontinuation of the prednisone and simple monitoring for recurrence. Should she again relapse, the decision would need to be made to either continue low dose prednisone or else institute a steroid sparing agent such as methotrexate.     Positive CHELSEY-with no evidence of fabienne  connective tissue disease at this time.    Osteoarthritis-in the hands, neck and knees and this is stable.    RTC in 2 months with Alex Britton and 6 months with me.                  Sincerely,        Allan Beaulieu MD

## 2020-02-14 NOTE — PROGRESS NOTES
Ms. Sanchez returns for evaluation and management of PMR. +CHELSEY, +RNP, dsDNA-, Sm-, SSA/B-. Previously treated with prednisone.    In January she developed total body pain and fatigue. She also had rash on her abdomen that was associated with itching.    She was treated with prednisone 40 mg with taper over 10 days. This cleared the rash, pain and weakness within 1 day. Today was her last day of 10 mg of prednisone. Today she feels normal.    There was no swelling, redness, or warmth of joints. She had fatigue and weakness that limited her ability to lift objects and walk. She was dosing off in the chair more than usual.    No oral ulcers, chest pains, or alopecia. She has stable dry mouth. Energy is normal today. AM stiffness was 1 hour.     PMI:  Medical-hypothyroidism, osteoporosis (T = -1.5 3-2019), empyema, carpal tunnel disease right, collagenous colitis, plantar fasciitis, eczema  Surgical-bunionectomy bilateral, carpal tunnel release, cataract surgery bilateral, right lung resection/pleural surgery/evacuation, tonsillectomy  Injuries-none    SH:  , previous  provider, 5 children, no tobacco or EtOH. Right handed.    FH:  Mother dead with alzheimer's  Father dead with MI  Sibs and children are healthy    PMSF history personally obtained and updated by me this visit.    ROS:  +palpitations were present on occasion  +actonel and alendronate intolerant  Remainder of the 14 point ROS obtained and found negative.    Physical Exam:  Constitutional: WD-WN-WG cooperative  Eyes: nl EOM, PERRLA, vision, conjunctiva, sclera  ENT: nl external ears, nose, hearing, lips, teeth, gums, throat  Neck: no mass or thyroid enlargement  Resp: lungs clear to auscultation, nl to palpation, nl effort  CV: RRR, no murmurs, rubs or gallops, no edema  GI: no ABD mass or tenderness, no HSM  : not tested  Lymph: no cervical or epitrochlear nodes  MS: +diffuse heberden's nodes and right PIP 2,3 perez's nodes; subtle 1st CMC  squaring and thumb Z-laxity; normal  but right hand prayer sign and reduced tuck with OA deformities; neck rotation limited to 30 degrees right and 45 degrees left with slightly reduced flexion and extension; Right knee with trace swelling; bilateral knee flexion to 120 degrees; All other TMJ, neck, shoulder, elbow, wrist, hand, spine, hip, knee, ankle, and foot joints were examined and otherwise found normal. No active synovitis.  Skin: no nail pitting, alopecia, rash; +healing excoriations on the abdomen skin  Neuro: nl cranial nerves, strength, sensation, DTRs  Psych: nl judgement, orientation, memory, affect.    Laboratory:    Exam Date Exam Time Accession # Results    2/3/20  3:10 PM     Contains abnormal data COMP METABOLIC PANEL   Order: [id number]   (suggestion)  Information displayed in this report will not trend or trigger automated decision support.    Ref Range & Units Value   SODIUM 135 - 145 mmol/L 139    POTASSIUM 3.5 - 5.0 mmol/L 4.9    CHLORIDE 98 - 110 mmol/L 104    CO2,TOTAL 21 - 31 mmol/L 28    ANION GAP 5 - 18  7    GLUCOSE 65 - 100 mg/dL 83    CALCIUM 8.5 - 10.5 mg/dL 9.7    BUN 8 - 25 mg/dL 26High     CREATININE 0.57 - 1.11 mg/dL 1.14High     BUN/CREAT RATIO           10 - 20  23High     GFR if African American >60 ml/min/1.73m2 56Low     GFR if not African American >60 ml/min/1.73m2 46Low     ALBUMIN 3.2 - 4.6 g/dL 4.0    PROTEIN,TOTAL 6.0 - 8.0 g/dL 7.2    GLOBULIN                  2.0 - 3.7 g/dL 3.2    A/G RATIO 1.0 - 2.0  1.3    BILIRUBIN,TOTAL 0.2 - 1.2 mg/dL 0.4    ALK PHOSPHATASE 50 - 136 IU/L 69    ALT (SGPT) 8 - 45 IU/L 17    AST (SGOT) 2 - 40 IU/L 21    Resulting Agency  [facility]ORAT[facility]      Ref Range & Units Value   C-REACTIVE PROTEIN        <0.50 mg/dL 0.36    Resulting Agency        Ref Range & Units Value   CK,TOTAL 29 - 168 IU/L 100       Ref Range & Units Value   WHITE BLOOD COUNT         4.5 - 11.0 thou/cu mm 8.1    RED BLOOD COUNT           4.00 -  5.20 mil/cu mm 4.10    HEMOGLOBIN                12.0 - 16.0 g/dL 13.3    HEMATOCRIT                33.0 - 51.0 % 40.2    MCV                       80 - 100 fL 98    MCH                       26.0 - 34.0 pg 32.4    MCHC                      32.0 - 36.0 g/dL 33.1    RDW                       11.5 - 15.5 % 13.0    PLATELET COUNT            140 - 440 thou/cu mm 274    MPV                       6.5 - 11.0 fL 9.5    NEUTROPHILS                % 62.9    LYMPHOCYTES                % 23.1    MONOCYTES                  % 10.7    EOSINOPHILS                % 2.6    BASOPHILS                  % 0.7    ABSOLUTE NEUTROPHILS      1.7 - 7.0 thou/cu mm 5.1    ABSOLUTE LYMPHOCYTES      0.9 - 2.9 thou/cu mm 1.9    ABSOLUTE MONOCYTES        <0.9 thou/cu mm 0.9High     ABSOLUTE EOSINOPHILS      <0.5 thou/cu mm 0.2    ABSOLUTE BASOPHILS        <0.3 thou/cu mm 0.1    Resulting Agency        Ref Range & Units Value   ANTINUCLEAR ANTIBODY (CHELSEY) Negative  PositiveAbnormal     CHELSEY PATTERN 1 (none)  HomogenousAbnormal     CHELSEY TITER 1 (none)  1:640Abnormal        Ref Range & Units Value   SEDIMENTATION RATE        <31 mm/hr 20        Impression:    Polymyalgia rheumatica-by history with excellent response to corticosteroid therapy. This episode was complicated by rash of uncertain etiology, and her systemic inflammatory markers remained low. Thus, the diagnosis is not entirely clear. Nonetheless, she is improved and has tolerated the prednisone well. Based on this we agree to continue low dose prednisone 5 mg daily for 1 month, then 2.5 mg daily for one month. Next visit consider discontinuation of the prednisone and simple monitoring for recurrence. Should she again relapse, the decision would need to be made to either continue low dose prednisone or else institute a steroid sparing agent such as methotrexate.     Positive CHELSEY-with no evidence of fabienne connective tissue disease at this time.    Osteoarthritis-in the hands, neck and knees and  this is stable.    RTC in 2 months with Alex Britton and 6 months with me.

## 2020-02-14 NOTE — NURSING NOTE
Libertad Sanchez's goals for this visit include: Return  She requests these members of her care team be copied on today's visit information: PCP    PCP: Barber Lopez    Referring Provider:  No referring provider defined for this encounter.    /71 (BP Location: Left arm)   Pulse 59   Resp 18   Wt 62.6 kg (138 lb)   SpO2 96%   BMI 24.45 kg/m      Do you need any medication refills at today's visit? N

## 2020-02-14 NOTE — PATIENT INSTRUCTIONS
If you have had any blood work, imaging or other testing completed we will be in touch within 1-2 weeks regarding the results.     If you need refills please contact your pharmacy.     It was a pleasure meeting with you today. Please let us know if there is anything else we can do for you so that we can be sure you are leaving completely satisfied with your care experience.     If you have any questions, concerns or need to schedule a follow up, please contact us at [phone] and our fax [phone].    Your Rheumatology Team at [facility]

## 2020-03-30 ENCOUNTER — TELEPHONE (OUTPATIENT)
Dept: RHEUMATOLOGY | Facility: CLINIC | Age: 80
End: 2020-03-30

## 2020-03-30 NOTE — TELEPHONE ENCOUNTER
Spoke with Libertad. She is currently taking 2.5 mg of prednisone and is feeling well. 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 Discussed canceling her lab appointment and changing her office visit to a Telephone visit due to Covid - 19. She was agreeable to this plan and was educated on what would happen day of. (cma/lpn call for information then Alex's call) She had no further questions.     CHACHO MoranN, RN   Rheumatology Care Coordinator   [facility]

## 2020-03-30 NOTE — TELEPHONE ENCOUNTER
Patient wanting to know if she should still be coming in for appt on 4/10. She knows she needs bloodwork and wanting to make sure she still does need to come to appt in clinic along with her bloodwork? Or can she just be prescribed meds based on bloodwork and not come in?    Please call to advise.

## 2020-04-10 ENCOUNTER — VIRTUAL VISIT (OUTPATIENT)
Dept: RHEUMATOLOGY | Facility: CLINIC | Age: 80
End: 2020-04-10
Payer: MEDICARE

## 2020-04-10 DIAGNOSIS — M81.0 AGE-RELATED OSTEOPOROSIS WITHOUT CURRENT PATHOLOGICAL FRACTURE: Chronic | ICD-10-CM

## 2020-04-10 DIAGNOSIS — M35.3 POLYMYALGIA RHEUMATICA (H): ICD-10-CM

## 2020-04-10 DIAGNOSIS — M19.041 PRIMARY OSTEOARTHRITIS OF BOTH HANDS: Chronic | ICD-10-CM

## 2020-04-10 DIAGNOSIS — M19.042 PRIMARY OSTEOARTHRITIS OF BOTH HANDS: Chronic | ICD-10-CM

## 2020-04-10 DIAGNOSIS — R76.8 POSITIVE ANA (ANTINUCLEAR ANTIBODY): ICD-10-CM

## 2020-04-10 DIAGNOSIS — Z79.60 LONG-TERM USE OF IMMUNOSUPPRESSANT MEDICATION: ICD-10-CM

## 2020-04-10 PROCEDURE — 98967 PH1 ASSMT&MGMT NQHP 11-20: CPT | Performed by: NURSE PRACTITIONER

## 2020-04-10 RX ORDER — PREDNISONE 5 MG/1
TABLET ORAL
Qty: 30 TABLET | Refills: 1 | Status: SHIPPED | OUTPATIENT
Start: 2020-04-10 | End: 2020-05-15

## 2020-04-10 NOTE — PROGRESS NOTES
"Libertad Sanchez is a 79 year old female who is being evaluated via a billable telephone visit.      The patient has been notified of following:     \"This telephone visit will be conducted via a call between you and your physician/provider. We have found that certain health care needs can be provided without the need for a physical exam.  This service lets us provide the care you need with a short phone conversation.  If a prescription is necessary we can send it directly to your pharmacy.  If lab work is needed we can place an order for that and you can then stop by our lab to have the test done at a later time.  Telephone visits are billed at different rates depending on your insurance coverage. During this emergency period, for some insurers they may be billed the same as an in-person visit.  Please reach out to your insurance provider with any questions.If during the course of the call the physician/provider feels a telephone visit is not appropriate, you will not be charged for this service.\"    Patient has given verbal consent for Telephone visit?  Yes    How would you like to obtain your AVS? Mail a copy    Libertad Sanchez complains of    Chief Complaint   Patient presents with     Consult     PMR       I have reviewed and updated the patient's Past Medical History, Social History, Family History and Medication List.    ALLERGIES  Patient has no known allergies.    Additional provider notes:   Ms. Sanchez returns for evaluation and management of PMR. +CHELSEY, +RNP, dsDNA-, Sm-, SSA/B-. Previously treated with prednisone. Co-manage with Dr. Beaulieu    Seen Dr. Beaulieu 2-14- January she developed total body pain and fatigue. She also had rash on her abdomen waist line under arms torso (was not shingles) that was associated with itching. She was treated with prednisone 40 mg with taper over 10 days. This cleared the rash, pain and weakness within 1 day. Today was her last day of 10 mg of prednisone. Complete " "resolution. He gave her prednisone 5 mg x 1 month then 2.5 mg x 1 anitra with of to stop today or add methotrexate  Or steroid sparing med      Today on prednisone 2.5 mg day which she did 3-15 tolerating well. No side-effects. When seen him, took the high dose prednisone which she tolerated \"knocked it right out\". Helping a lot. \"nervous to stop\". Mild aches, cares for her handicap spouse. Bad PMR in Jan. In Oct, was off prednisone. Can move well. All over mild aches mornings. No upper back pain, no jaw claudication, no scalp tenderness. No rash. Energy is good. Able to care for spouse total care. She can manage. Same as 5 mg to 2.5 mg no difference this is the same. There was no swelling, redness, or warmth of joints. Able to walk no weakness. Able to She had fatigue and abkle lift objects and walk.  No oral ulcers, chest pains, or alopecia. She has stable dry mouth. Energy is normal today. AM stiffness was 1 hour hands.  Able to lift arms over head, mild from osteoarthritis left shoulder. No headaches.      PMI:  Medical-hypothyroidism, osteoporosis (T = -1.5 3-2019), empyema, carpal tunnel disease right, collagenous colitis, plantar fasciitis, eczema, chronic kidney disease, osteoarthritis hands and left shoulder      Surgical-bunionectomy bilateral, carpal tunnel release, cataract surgery bilateral, right lung resection/pleural surgery/evacuation, tonsillectomy  Injuries-none     SH:  , previous  provider, 5 children, no tobacco or EtOH. Right handed.     FH:  Mother dead with alzheimer's  Father dead with MI  Sibs and children are healthy     PMSF history personally obtained and updated by me this visit.     ROS:  +palpitations were present on occasion gone  +see above  +actonel and alendronate intolerant  Skin: negative  Eyes: negative  Ears/Nose/Throat: negative  Respiratory: No shortness of breath, dyspnea on exertion, cough, or hemoptysis  Cardiovascular: negative  Gastrointestinal: " negative  Genitourinary: negative  Musculoskeletal: negative  Neurologic: negative  Psychiatric: negative  Hematologic/Lymphatic/Immunologic: negative  Endocrine: negative    Remainder of the 14 point ROS obtained and found negative.      Laboratory:  2-2-2020  NL CBCD   Creat 1.14 GFR 46  Normal liver panel   normal   CRP normal   ESR normal     +CHELSEY homogenous 1:640      XR left shoulder  Impression:  1. No acute osseous abnormality.  2. Moderate glenohumeral joint degenerative changes    Impression:  Polymyalgia rheumatica-by history with excellent response to corticosteroid therapy. Episode in Jan complicated by rash of uncertain etiology, and her systemic inflammatory markers remained low but dramatic improvement in prednisone 40 mg taper. Diagnosis is not entirely clear. Nonetheless, she tapered from 5 mg to 2.5 mg. Doing well. Resolution, mild symptoms but able to function and care for spouse. Given pandemic and her needing to 100% care for spouse, she would like to remain on low dose 2.5 mg and agree telephone visit in a month. Then decide labs at that time. If any PMR or GCA, she will call immediately. Next visit consider discontinuation of the prednisone and monitoring for recurrence. Should she again relapse, the decision would need to be made to either continue low dose prednisone or else institute a steroid sparing agent such as methotrexate.      Positive CHELSEY-with no evidence of fabienne connective tissue disease at this time.     Osteoarthritis-in the hands, neck and knees, shoulder and this is stable.     RTC in 4 months with Dr. Beaulieu   Me 1 month with telephone    Phone call duration: 15 minutes  1200 start  1215 guru Britton, APRN CNP

## 2020-04-10 NOTE — PATIENT INSTRUCTIONS
Patient Education     Understanding Polymyalgia Rheumatica  Polymyalgia rheumatica (PMR) is an inflammatory condition that can cause aching and stiffness. It tends to affect the neck, shoulders, and hips. The aching and stiffness are usually worse in the morning.  PMR can come on suddenly. For some it seems to occur overnight. For others it can take days or weeks to develop. PMR affects only older adults. It becomes more common with age. PMR occurs most often between the ages of 70 and 80. It is more common in women than in men, and it seems to run in some families.  What causes polymyalgia rheumatica?  Researchers are working to understand what causes PMR. Because it can happen quickly and tends to occur at certain times of year, some think that an infection may cause it. It may be related to immune system problems. Genes may be part of the cause. PMR can run in some families.  Symptoms of polymyalgia rheumatica  The main symptoms of PMR are aching and stiffness of the shoulders, neck, and hips. The aching can extend to the upper arms and thighs. PMR tends to affect both sides of the body equally. Symptoms are often worse in the morning or after long periods of no activity. Movement can make the pain worse.  The symptoms of PMR usually affect the shoulders the most. You may have trouble raising your arms above the level of your shoulders. This can make it hard to get dressed. You may have trouble rolling over in bed, getting out of bed, and getting up from sitting. You may also have trouble sleeping because of your symptoms.  Other symptoms can occur. These include:    Swelling of the hands, wrists, feet, and ankles    Numbness, tingling, or pain in the hand, wrist, or forearm    Feeling of weakness    General feeling of being unwell    Feeling tired    Loss of appetite    Weight loss    Low-grade fever  Diagnosing polymyalgia rheumatica  Your healthcare provider will ask about your health history and your  symptoms. He or she will give you a physical exam. The exam will check your range of motion, strength, and painful areas.  Diagnosing PMR can be difficult. Your healthcare provider will need to make sure you have PMR. Many conditions can cause aching and stiffness. These include rheumatoid arthritis and fibromyalgia. You may need tests such as:    Blood tests to look for signs of inflammation, blood count problems, and muscle damage    Muscle biopsy to check for damage    Biopsy of a blood vessel in your temple    X-rays to look at your joints    MRI for detailed pictures of your joints and tissues    Ultrasound to look most closely at the soft tissues around your joints  Your healthcare provider may also diagnose you by giving you medicine. PMR often responds quickly to steroid medicine. This can help show if you have PMR. You may also be referred to a rheumatologist for diagnosis.  Date Last Reviewed: 5/1/2017 2000-2019 [employer]. [address] [place] [address]. All rights reserved. This information is not intended as a substitute for professional medical care. Always follow your healthcare professional's instructions.           Patient Education     Temporal Arteritis  You have temporal arteritis (also called  giant cell arteritis ). This is an inflammation of the blood vessels that supply the head, neck, upper body, and arms. It can be diagnosed by examining a small piece of temporal artery, which is easily accessible. This artery is located in the scalp area just above each ear. When the arteries are inflamed, the vessel narrows, and blood flow slows down. A clot may also form inside the artery, stopping all blood flow. Reduced or blocked blood flow in the arteries that supply vital structures in the eye can cause blindness in the affected eye. In rare cases, stroke may occur.  The cause of temporal arteritis is not known. Symptoms of temporal arteritis include headache, tenderness  of the temples or scalp, jaw pain when chewing, muscle aches, fatigue, fever, and unintentional weight loss.  Steroids, such as prednisone, are used to treat this condition. These reduce inflammation in the arteries. Most people start to feel better a few days after treatment starts. Most people do recover from this condition, but it may require continued treatment for 1 or 2 years. Long-term steroid treatment can cause various serious side effects. These include osteoporosis, high blood pressure, and diabetes. Talk to your healthcare provider about side effects and ways to minimize them.  Home care  Take medicines as directed by your healthcare provider. These suggestions will help reduce side effects from long-term steroid use.  Nutrition    Eat plenty of fresh fruits, vegetables, and whole grains.    Choose mostly lean sources of protein.    Limit the use of salt, sugar, and alcohol.    Be sure to get enough calcium and vitamin D. Low-fat dairy foods are an excellent source of these nutrients. If you are not able to eat dairy, you can choose lactose-free products instead. Here are some other foods that contain calcium:  ? Kale and broccoli  ? White beans, green beans, and lima beans  ? [place]icao  ? Soybeans or tofu  ? Fortified juices  ? You can also take daily calcium supplements. Ask your healthcare provider how much calcium you should take each day.  Exercise  Get regular aerobic exercise, up to 30 minutes on most days. Exercise can help prevent bone loss, heart disease, and diabetes. It also relieves stress and can improve your mood and your overall quality of life. If you don t already exercise regularly, ask your healthcare provider for help setting up a program.  Follow-up care  Follow up with your healthcare provider, or as advised.  For more information about temporal arteritis, see:    National [place] of Arthritis and Musculoskeletal and Skin Diseases, www.niams.nih.gov    The Arthritis Foundation,  www.arthritis.org  When to seek medical advice  Call your healthcare provider right away if any of these occur:    Worsening symptoms    Numbness or weakness of the face, one arm, or one leg    Slurred speech, confusion, or trouble speaking or walking    Severe headache    Fainting spell, dizziness, or seizure    Pain in the chest, arm, neck, or upper back    Sudden loss or change in vision  Date Last Reviewed: 9/1/2017 2000-2019 [employer]. [address] [place] [address]. All rights reserved. This information is not intended as a substitute for professional medical care. Always follow your healthcare professional's instructions.           Patient Education     Temporal Arteritis  You have temporal arteritis (also called  giant cell arteritis ). This is an inflammation of the blood vessels that supply the head, neck, upper body, and arms. It can be diagnosed by examining a small piece of temporal artery, which is easily accessible. This artery is located in the scalp area just above each ear. When the arteries are inflamed, the vessel narrows, and blood flow slows down. A clot may also form inside the artery, stopping all blood flow. Reduced or blocked blood flow in the arteries that supply vital structures in the eye can cause blindness in the affected eye. In rare cases, stroke may occur.  The cause of temporal arteritis is not known. Symptoms of temporal arteritis include headache, tenderness of the temples or scalp, jaw pain when chewing, muscle aches, fatigue, fever, and unintentional weight loss.  Steroids, such as prednisone, are used to treat this condition. These reduce inflammation in the arteries. Most people start to feel better a few days after treatment starts. Most people do recover from this condition, but it may require continued treatment for 1 or 2 years. Long-term steroid treatment can cause various serious side effects. These include osteoporosis, high blood  pressure, and diabetes. Talk to your healthcare provider about side effects and ways to minimize them.  Home care  Take medicines as directed by your healthcare provider. These suggestions will help reduce side effects from long-term steroid use.  Nutrition    Eat plenty of fresh fruits, vegetables, and whole grains.    Choose mostly lean sources of protein.    Limit the use of salt, sugar, and alcohol.    Be sure to get enough calcium and vitamin D. Low-fat dairy foods are an excellent source of these nutrients. If you are not able to eat dairy, you can choose lactose-free products instead. Here are some other foods that contain calcium:  ? Kale and broccoli  ? White beans, green beans, and lima beans  ? Amber  ? Soybeans or tofu  ? Fortified juices  ? You can also take daily calcium supplements. Ask your healthcare provider how much calcium you should take each day.  Exercise  Get regular aerobic exercise, up to 30 minutes on most days. Exercise can help prevent bone loss, heart disease, and diabetes. It also relieves stress and can improve your mood and your overall quality of life. If you don t already exercise regularly, ask your healthcare provider for help setting up a program.  Follow-up care  Follow up with your healthcare provider, or as advised.  For more information about temporal arteritis, see:    National [place] of Arthritis and Musculoskeletal and Skin Diseases, www.niams.nih.gov    The Arthritis Foundation, www.arthritis.org  When to seek medical advice  Call your healthcare provider right away if any of these occur:    Worsening symptoms    Numbness or weakness of the face, one arm, or one leg    Slurred speech, confusion, or trouble speaking or walking    Severe headache    Fainting spell, dizziness, or seizure    Pain in the chest, arm, neck, or upper back    Sudden loss or change in vision  Date Last Reviewed: 9/1/2017 2000-2019 [employer]. [address] [facility] [place] [facility]\""  [place] [address]. All rights reserved. This information is not intended as a substitute for professional medical care. Always follow your healthcare professional's instructions.

## 2020-04-15 ENCOUNTER — TELEPHONE (OUTPATIENT)
Dept: RHEUMATOLOGY | Facility: CLINIC | Age: 80
End: 2020-04-15

## 2020-04-15 NOTE — TELEPHONE ENCOUNTER
4/15 Provided phone number [phone] to schedule Telephone visit 1 month around 5/15/20.    Andree Brothers   Procedure    Ortho/Sports Med/Ent/Eye   [facility]   [phone]

## 2020-05-15 ENCOUNTER — VIRTUAL VISIT (OUTPATIENT)
Dept: RHEUMATOLOGY | Facility: CLINIC | Age: 80
End: 2020-05-15
Payer: MEDICARE

## 2020-05-15 DIAGNOSIS — R76.8 POSITIVE ANA (ANTINUCLEAR ANTIBODY): ICD-10-CM

## 2020-05-15 DIAGNOSIS — M81.0 AGE-RELATED OSTEOPOROSIS WITHOUT CURRENT PATHOLOGICAL FRACTURE: Chronic | ICD-10-CM

## 2020-05-15 DIAGNOSIS — M19.041 PRIMARY OSTEOARTHRITIS OF BOTH HANDS: Chronic | ICD-10-CM

## 2020-05-15 DIAGNOSIS — Z79.60 LONG-TERM USE OF IMMUNOSUPPRESSANT MEDICATION: ICD-10-CM

## 2020-05-15 DIAGNOSIS — M35.3 POLYMYALGIA RHEUMATICA (H): ICD-10-CM

## 2020-05-15 DIAGNOSIS — M19.042 PRIMARY OSTEOARTHRITIS OF BOTH HANDS: Chronic | ICD-10-CM

## 2020-05-15 PROCEDURE — 99441 ZZC PHYSICIAN TELEPHONE EVALUATION 5-10 MIN: CPT | Performed by: NURSE PRACTITIONER

## 2020-05-15 RX ORDER — PREDNISONE 5 MG/1
TABLET ORAL
Qty: 30 TABLET | Refills: 2 | Status: SHIPPED | OUTPATIENT
Start: 2020-05-15 | End: 2020-11-20

## 2020-05-15 NOTE — PROGRESS NOTES
"Libertad Sanchez is a 79 year old female who is being evaluated via a billable telephone visit.      The patient has been notified of following:     \"This telephone visit will be conducted via a call between you and your physician/provider. We have found that certain health care needs can be provided without the need for a physical exam.  This service lets us provide the care you need with a short phone conversation.  If a prescription is necessary we can send it directly to your pharmacy.  If lab work is needed we can place an order for that and you can then stop by our lab to have the test done at a later time.    Telephone visits are billed at different rates depending on your insurance coverage. During this emergency period, for some insurers they may be billed the same as an in-person visit.  Please reach out to your insurance provider with any questions.    If during the course of the call the physician/provider feels a telephone visit is not appropriate, you will not be charged for this service.\"    Patient has given verbal consent for Telephone visit?  Yes    What phone number would you like to be contacted at? [phone]    How would you like to obtain your AVS? Thania Martinez LPN      Phone call duration: 10 minutes    Alex Britton, SABINA CNP    Ms. Sanchez returns for evaluation and management of PMR. +CHELSEY, +RNP, dsDNA-, Sm-, SSA/B-. Previously treated with prednisone. Co-manage with Dr. Beaulieu    Copy forward 4-:    Seen Dr. Beaulieu 2-14- January she developed total body pain and fatigue. She also had rash on her abdomen waist line under arms torso (was not shingles) that was associated with itching. She was treated with prednisone 40 mg with taper over 10 days. This cleared the rash, pain and weakness within 1 day. Today was her last day of 10 mg of prednisone. Complete resolution. He gave her prednisone 5 mg x 1 month then 2.5 mg x 1 anitra with of to stop today or add methotrexate  " "Or steroid sparing med      Today on prednisone 2.5 mg day which she did 3-15 tolerating well. No side-effects. When seen him, took the high dose prednisone which she tolerated \"knocked it right out\". Helping a lot. \"nervous to stop\". Mild aches, cares for her handicap spouse. Bad PMR in Jan. In Oct, was off prednisone. Can move well. All over mild aches mornings. No upper back pain, no jaw claudication, no scalp tenderness. No rash. Energy is good. Able to care for spouse total care. She can manage. Same as 5 mg to 2.5 mg no difference this is the same. There was no swelling, redness, or warmth of joints. Able to walk no weakness. Able to She had fatigue and abkle lift objects and walk.  No oral ulcers, chest pains, or alopecia. She has stable dry mouth. Energy is normal today. AM stiffness was 1 hour hands.  Able to lift arms over head, mild from osteoarthritis left shoulder. No headaches.    May 15, 2020  PMR controlled. \"maintained\" not worse on prednisone 2.5 mg day. Tolerating no side-effects. Would like to continue this as she is able to function and care for her spouse. She has N95 mask but tries not to go out. Energy good. Hands are good. Muscles sore at time. No weakness or LBP. Mornings good. No rashs or GCA symptoms. No headaches or vision issues. No NSAID. Appetite good. Walking good.  No upper back pain, no jaw claudication, no scalp tenderness. Denies any fever, chills, SOB, PARRA, night sweats, or chest pain, high fever, cough, travel in last 14 days or exposure to covid-19 (coronavirus). Reports healthy. No falls. Able to lift arms over her head. \"Spells\" are much better since on prednisone and seeing Dr. Beaulieu. Last was over 2 weeks ago, mild then away on own. No precipitating or associated factors. Past echo ok. She knows where to get Covid-19 (coronavirus) testing if need     PMI:  Medical-hypothyroidism, osteoporosis (T = -1.5 3-2019), empyema, carpal tunnel disease right, collagenous colitis, " plantar fasciitis, eczema, chronic kidney disease, osteoarthritis hands and left shoulder      Surgical-bunionectomy bilateral, carpal tunnel release, cataract surgery bilateral, right lung resection/pleural surgery/evacuation, tonsillectomy  Injuries-none     SH:  , previous  provider, 5 children, no tobacco or EtOH. Right handed.     FH:  Mother dead with alzheimer's  Father dead with MI  Sibs and children are healthy     PMSF history personally obtained and updated by me this visit.     ROS:  +palpitations were present on occasion gone  +see above  +actonel and alendronate intolerant  Skin: negative  Eyes: negative  Ears/Nose/Throat: negative  Respiratory: No shortness of breath, dyspnea on exertion, cough, or hemoptysis  Cardiovascular: negative  Gastrointestinal: negative  Genitourinary: negative  Musculoskeletal: negative  Neurologic: negative  Psychiatric: negative  Hematologic/Lymphatic/Immunologic: negative  Endocrine: negative    Remainder of the 14 point ROS obtained and found negative.      Laboratory:  2-2-2020  [facility]   Creat 1.14 GFR 46  Normal liver panel   normal   CRP normal   ESR normal     +CHELSEY homogenous 1:640      XR left shoulder  Impression:  1. No acute osseous abnormality.  2. Moderate glenohumeral joint degenerative changes    Impression:  Polymyalgia rheumatica-by history with excellent response to corticosteroid therapy. Episode in Jan complicated by rash of uncertain etiology, and her systemic inflammatory markers remained low but dramatic improvement in prednisone 40 mg taper. Diagnosis is not entirely clear. Nonetheless, she tapered from 5 mg to 2.5 mg. Doing well. Resolution, mild symptoms but able to function and care for spouse. Given pandemic and her needing to 100% care for spouse, she would like to remain on low dose 2.5 mg and agree labs close to home in a month. If any PMR or GCA, she will call immediately. Should she again relapse, the decision  would need to be made to either continue low dose prednisone or else institute a steroid sparing agent such as methotrexate.      Positive CHELSEY-with no evidence of fabienne connective tissue disease at this time.     Osteoarthritis-in the hands, neck and knees, shoulder and this is stable.     RTC in 3 months with Dr. Beaulieu       Phone call duration: 10 minutes  135 start  145 guru Britton, SABINA CNP

## 2020-07-16 ENCOUNTER — TELEPHONE (OUTPATIENT)
Dept: RHEUMATOLOGY | Facility: CLINIC | Age: 80
End: 2020-07-16

## 2020-07-16 NOTE — TELEPHONE ENCOUNTER
BUN 23   GFR 51 -reduced but stable  WBC 6.5 normal   Hemoglobin 12.8 normal   Hematocrit, MCV, MCH, RDW, MPV, platelets normal   Inflammatory labs ESR and CRP normal   Liver tests panel normal (albumin, ALT, AST, total protein, total bili)

## 2020-07-16 NOTE — LETTER
July 16, 2020        Libertad Sanchez                                                                [address] [place] [id number]              Dear Libertad,    The results of your recent liver tests, complete blood count (checks for presence of infection, anemia etc)} were normal.     The results of your recent kidney test were reduce, but stable.      BUN 23   GFR 51 -reduced but stable  WBC 6.5 normal   Hemoglobin 12.8 normal   Hematocrit, MCV, MCH, RDW, MPV, platelets normal   Inflammatory labs ESR and CRP normal   Liver tests panel normal (albumin, ALT, AST, total protein, total bili)     Please make a follow-up appointment if you have additional questions.    Sincerely,       Thank-you for allowing me to participate in your care.     Alex Britton APRN, CNP, MSN  [facility]  Department of Rheumatology & Autoimmune Disorders  [facility]:

## 2020-07-16 NOTE — TELEPHONE ENCOUNTER
Please see labs in CareEverywhere from [facility] on 7/1.    CHACHO MoranN, RN   Rheumatology Care Coordinator   [facility]

## 2020-08-21 ENCOUNTER — VIRTUAL VISIT (OUTPATIENT)
Dept: RHEUMATOLOGY | Facility: CLINIC | Age: 80
End: 2020-08-21
Payer: MEDICARE

## 2020-08-21 DIAGNOSIS — Z79.60 LONG-TERM USE OF IMMUNOSUPPRESSANT MEDICATION: ICD-10-CM

## 2020-08-21 DIAGNOSIS — M19.041 PRIMARY OSTEOARTHRITIS OF BOTH HANDS: Chronic | ICD-10-CM

## 2020-08-21 DIAGNOSIS — M81.0 AGE-RELATED OSTEOPOROSIS WITHOUT CURRENT PATHOLOGICAL FRACTURE: Chronic | ICD-10-CM

## 2020-08-21 DIAGNOSIS — M19.042 PRIMARY OSTEOARTHRITIS OF BOTH HANDS: Chronic | ICD-10-CM

## 2020-08-21 DIAGNOSIS — M35.3 POLYMYALGIA RHEUMATICA (H): ICD-10-CM

## 2020-08-21 DIAGNOSIS — R76.8 POSITIVE ANA (ANTINUCLEAR ANTIBODY): ICD-10-CM

## 2020-08-21 PROCEDURE — 99443 ZZC PHYSICIAN TELEPHONE EVALUATION 21-30 MIN: CPT | Performed by: INTERNAL MEDICINE

## 2020-08-21 RX ORDER — PREDNISONE 5 MG/1
TABLET ORAL
Qty: 30 TABLET | Refills: 3 | Status: CANCELLED | OUTPATIENT
Start: 2020-08-21

## 2020-08-21 NOTE — Clinical Note
Libertad is having dizziness, now that she has come off the prednisone. I have some concern that this is adrenal insufficiency and she is at risk for a fall. I wonder if the work up can be completed with some lab testing and a virtual visit?    Ramon

## 2020-08-21 NOTE — PATIENT INSTRUCTIONS
Plan to follow up as soon as possible with Dr. Goff in endocrinology to evaluate your adrenal gland function and dizziness. Contact endocrinology scheduling at [phone] to schedule.    For now, continue the low dose prednisone 2.5 mg daily    Plan to stop the prednisone only after the adrenal insufficiency workup is complete and the dizziness has subsided.    Follow up with me in 3 months, and get laboratory testing prior to the visit. You may complete lab work closer to home at [facility]. Address: [address] [place] [address] [place] [address]. Phone: [phone]

## 2020-08-21 NOTE — PROGRESS NOTES
"Libertad Sanchez is a 80 year old female who is being evaluated via a billable telephone visit.      The patient has been notified of following:     \"This telephone visit will be conducted via a call between you and your physician/provider. We have found that certain health care needs can be provided without the need for a physical exam.  This service lets us provide the care you need with a short phone conversation.  If a prescription is necessary we can send it directly to your pharmacy.  If lab work is needed we can place an order for that and you can then stop by our lab to have the test done at a later time.    Telephone visits are billed at different rates depending on your insurance coverage. During this emergency period, for some insurers they may be billed the same as an in-person visit.  Please reach out to your insurance provider with any questions.    If during the course of the call the physician/provider feels a telephone visit is not appropriate, you will not be charged for this service.\"    Patient has given verbal consent for Telephone visit?  Yes    What phone number would you like to be contacted at? [phone] (H)    How would you like to obtain your AVS? Thania Grimes LPN    Rheumatology / Pulmonology  [facility]      Phone call duration: 22 minutes    Allan Beaulieu MD        Ms. Sanchez has PMR since February 2019. +CHELSEY, +RNP, dsDNA-, Sm-, SSA/B-. P    She is doing much better from a pain perspective. Just normal aches and pains for aging.     She describes some dizziness that has developed and has some concern about adrenal insufficiency. She is now intermittently light headed and weak, but she can also have some vertigo. She was supposed to return to endocrinology to evaluate further for    Today is the first day that she did not take a prednisone as part of her tapering.     PMI:  Medical-hypothyroidism, osteoporosis (T = -1.5 3-2019), empyema, carpal " tunnel disease right, collagenous colitis, plantar fasciitis, eczema  Surgical-bunionectomy bilateral, carpal tunnel release, cataract surgery bilateral, right lung resection/pleural surgery/evacuation, tonsillectomy  Injuries-none    SH:  , previous  provider, 5 children, no tobacco or EtOH. Right handed.    FH:  Mother dead with alzheimer's  Father dead with MI  Sibs and children are healthy    PMSF history personally obtained and updated by me this visit.    ROS:  +as above  +actonel and alendronate intolerant  Remainder of the 14 point ROS obtained and found negative.    Laboratory:    SEDIMENTATION RATE   Order: [id number]   (suggestion)   Information displayed in this report will not trend or trigger automated decision support.     Ref Range & Units  1mo ago    SEDIMENTATION RATE         <30 mm/hr  11     Specimen Collected: 07/01/20  2:02 PM  Last Resulted: 07/02/20  3:04 PM    Received From: [employer] [facility]   Result Received: 07/16/20 11:04 AM     Received Information    Contains abnormal data  CBC W PLT NO DIFF   Order: [id number]   (suggestion)   Information displayed in this report will not trend or trigger automated decision support.     Ref Range & Units  1mo ago    WHITE BLOOD COUNT          4.5 - 11.0 thou/cu mm  6.5     RED BLOOD COUNT            4.00 - 5.20 mil/cu mm  3.93Low       HEMOGLOBIN                 12.0 - 16.0 g/dL  12.8     HEMATOCRIT                 33.0 - 51.0 %  39.4     MCV                        80 - 100 fL  100     MCH                        26.0 - 34.0 pg  32.6     MCHC                       32.0 - 36.0 g/dL  32.5     RDW                        11.5 - 15.5 %  12.6     PLATELET COUNT             140 - 440 thou/cu mm  243     MPV                        6.5 - 11.0 fL  9.9     Specimen Collected: 07/01/20  2:02 PM  Last Resulted: 07/01/20  2:08 PM    Received From: [employer]   Result Received: 07/16/20 11:04 AM      Received Information    C-REACTIVE PROTEIN   Order: [id number]   (suggestion)   Information displayed in this report will not trend or trigger automated decision support.     Ref Range & Units  1mo ago    C-REACTIVE PROTEIN         <0.50 mg/dL  0.19     Specimen Collected: 07/01/20  2:02 PM  Last Resulted: 07/01/20  5:48 PM    Received From: [employer] [facility]   Result Received: 07/16/20 11:04 AM     Received Information    Contains abnormal data  COMP METABOLIC PANEL   Order: [id number]   (suggestion)   Information displayed in this report will not trend or trigger automated decision support.     Ref Range & Units  1mo ago    SODIUM  135 - 145 mmol/L  139     POTASSIUM  3.5 - 5.0 mmol/L  4.5     CHLORIDE  98 - 110 mmol/L  104     CO2,TOTAL  21 - 31 mmol/L  26     ANION GAP  5 - 18  9     GLUCOSE  65 - 100 mg/dL  86     CALCIUM  8.5 - 10.5 mg/dL  9.7     BUN  8 - 25 mg/dL  24     CREATININE  0.57 - 1.11 mg/dL  1.04     BUN/CREAT RATIO            10 - 20  23High       GFR if African American  >60 ml/min/1.73m2  >60     GFR if not African American  >60 ml/min/1.73m2  51Low       ALBUMIN  3.2 - 4.6 g/dL  3.9     PROTEIN,TOTAL  6.0 - 8.0 g/dL  6.8     GLOBULIN                   2.0 - 3.7 g/dL  2.9     A/G RATIO  1.0 - 2.0  1.3     BILIRUBIN,TOTAL  0.2 - 1.2 mg/dL  0.4     ALK PHOSPHATASE  50 - 136 IU/L  52     ALT (SGPT)  8 - 45 IU/L  21     AST (SGOT)  2 - 40 IU/L  26     Specimen Collected: 07/01/20  2:02 PM  Last Resulted: 07/01/20  5:52 PM    Received From: [facility]   Result Received: 07/16/20 11:04 AM     Received Information          Impression:    Polymyalgia rheumatica-now resolved on very low dose prednsone. It is my impression that her PMR has remitted. Nevertheless, I will continue the low dose prednisone 2.5 mg daily until her endocrinology evaluation is complete and her dizziness has improved.     Dizziness-with concern for adrenal  insufficiency following 1.5 years of prednisone use, now nearly tapered off. I have concern that she is at risk for falling. She will follow up in endocrinology for a formal adrenal insufficiency workup and treatment.     Positive CHELSEY-without evidence of connective tissue disease.    Osteoarthritis-stable.    Follow with me in 3 months.      Addendum:    Adrenal insufficiency is not present.   Plan to ask patient to undergo evaluation in primary care for complaint of dizziness.  Plan to reduce the prednisone to 2.5 mg every other day for 1 month, then stop.

## 2020-08-24 ENCOUNTER — TELEPHONE (OUTPATIENT)
Dept: ENDOCRINOLOGY | Facility: CLINIC | Age: 80
End: 2020-08-24

## 2020-08-24 DIAGNOSIS — Z79.52 CURRENT CHRONIC USE OF SYSTEMIC STEROIDS: Primary | ICD-10-CM

## 2020-08-24 NOTE — TELEPHONE ENCOUNTER
[facility] Call Center    Phone Message    May a detailed message be left on voicemail: yes     Reason for Call: Patient called wanting to discuss her adrenal levels with Dr. Ignacio. Dr. Beaulieu requested that she speaks to someone from the care team regarding this. Please advise. Thank you.    Action Taken: Message routed to:  Adult Clinics: Endocrinology p [id number]    Travel Screening: Not Applicable

## 2020-08-24 NOTE — TELEPHONE ENCOUNTER
"Refer to message received from call center. Patient has not seen Dr. Ignacio since April of 2019. Patient was scheduled for August 2019 follow-up with Dr. Ignacio but cancelled. No future visits with Dr. Ignacio scheduled.               Per 4/16/19 progress note from Dr. Ignacio:    In order to test, the first step would be to slowly taper down her prednisone to the lowest possible dose and check the HPA axis.  Discussed with Dr. Beaulieu; would let us know once patient is on a lower dose of prednisone.       Once she is on the lowest possible dose of prednisone; = or less than 5 mg; will check a morning cortisol with ACTH level or will perform a ACTH stim test to screen for adrenal insufficiency.            Per 8/21/20 virtual visit from Dr. Beaulieu:    Polymyalgia rheumatica-now resolved on very low dose prednsone. It is my impression that her PMR has remitted. Nevertheless, I will continue the low dose prednisone 2.5 mg daily until her endocrinology evaluation is complete and her dizziness has improved.      Dizziness-with concern for adrenal insufficiency following 1.5 years of prednisone use, now nearly tapered off. I have concern that she is at risk for falling. She will follow up in endocrinology for a formal adrenal insufficiency workup and treatment.               Contacted patient to review further. Patient states that she saw Dr. Ignacio in April 2019 and was suppose to follow-up, but forgets why she cancelled.     Patient states that she has been tapering her Prednisone with direction from Rheumatology and her most recent change was a decrease to 2.5 mg daily back in April this year.     Patient states that for the last month she has been having dizziness, weakness, she has been lightheaded and at times she experiences the \"room swirling.\" Patient notes that she has to be very careful about standing up due to her dizziness.     Patient states that she talked with Dr. Beaulieu on Friday and he " suggested she contact Dr. Ignacio for follow-up.       Questioned if patient has been checking her blood pressure, especially while symptomatic.  Patient notes that she has a home BP machine but has not been checking. Patient reports that in July at her primary care provider's office she was around 114/70.     Patient is anxious to get testing done ASAP due to feeling so unwell. Patient cares for her handicap  and needs to be back on her feet.     Reviewed with patient that Dr. Ignacio is out of the office all week but can run by on-call to determine if labs can be ordered ahead of time. Currently Dr. Ignacio has no opening for virtual or in person visits until end of September but is on call next week herself and could possibly get her on schedule then.    Patient notes that if she can get labs ordered this week, she would prefer orders be faxed to [facility] due to convenience (patient has to get ride and set up care for ).       Will send to on-call provider for review and get back to patient with plan. Patient verbalizes understanding and agrees to plan. In the meantime patient is going to check home blood pressure readings, especially while symptomactic (some lying, some sitting, some standing) and work on hydration.       Selma Dumont RN  Endocrine Care Coordinator  [facility]

## 2020-08-25 NOTE — TELEPHONE ENCOUNTER
Per Dr. Kate:    Selma,     I attempted to call patient today but was unable to reach her.   If I understand correctly she is now taking prednisone 2.5 mg per day. If this is the case she could try increasing to 5 mg per day and see if this helps with symptoms.   She will need further testing, and it is probably best to wait until Dr. Ignacio is back to direct this. We typically have patients hold the prednisone for 24 hours and then come to the lab in the early morning for a blood draw.   Imelda Kate         Patient advised of Dr. Kate's recommendations. Patient verbalizes understanding. Patient is hesitant to increase her Prednisone but notes that she is likely going to try today to see how she feels. Writer will send message to Dr. Ignacio to review when she returns for further instructions. Will also copy Dr. Beaulieu's office to update current plan for the week.         Selma Dumont RN  Endocrine Care Coordinator  [facility]

## 2020-08-26 ENCOUNTER — TELEPHONE (OUTPATIENT)
Dept: RHEUMATOLOGY | Facility: CLINIC | Age: 80
End: 2020-08-26

## 2020-08-26 NOTE — TELEPHONE ENCOUNTER
[facility] Call Center    Phone Message    May a detailed message be left on voicemail: yes     Reason for Call: Patient called wanting to discuss her prednisone and if she should take the 5mg since she hasn't done the blood work. Please advise. Thank you.    Action Taken: Message routed to:  Adult Clinics: Rheumatology p [id number]    Travel Screening: Not Applicable

## 2020-08-26 NOTE — TELEPHONE ENCOUNTER
Confirmed that we have been notified of the recommendations from Endocrinology and it is okay to follow their recommendations. She expressed understanding.     CHACHO MoranN, RN   Rheumatology Care Coordinator   [facility]

## 2020-08-28 ENCOUNTER — TELEPHONE (OUTPATIENT)
Dept: ENDOCRINOLOGY | Facility: CLINIC | Age: 80
End: 2020-08-28

## 2020-08-28 NOTE — TELEPHONE ENCOUNTER
Refer to 8/24/20 telephone encounter. Duplicate encounter opened.      Selma Dumont, RN  Endocrine Care Coordinator  [facility]

## 2020-08-28 NOTE — TELEPHONE ENCOUNTER
[facility] Call Center    Phone Message    May a detailed message be left on voicemail: yes     Reason for Call: Pt called today and is requesting that the lab orders Dr. Goff wants done be faxed to [facility] in [place].  Fax # is [phone].  Thanks.    Action Taken: Message routed to:  Adult Clinics: Endocrinology p [id number]    Travel Screening: Not Applicable

## 2020-08-28 NOTE — TELEPHONE ENCOUNTER
Per Dr. Ignacio:  Please fax the cortisol and ACTH order to the [facility].  Patient will plan on doing it on 8/31/2020 at 8 AM in the morning.  Patient appreciates a call once at the order is fax it over.  Please inform patient again that the blood work needs to be done at 8 AM in the morning before she takes her morning dose of prednisone on that same day.  She can bring the prednisone to the clinic and she can take it once blood is drawn.  Please schedule patient-for September 2020 with me/virtual.  If needed-she can be added on as a last patient after 4:30  PM.          Orders faxed to [facility]. Contacted patient and updated her. Reviewed instructions again with patient. Patient verbalizes understanding and agrees to plan. Scheduled patient for telephone visit on 9/29/20.      Selma Dumont RN  Endocrine Care Coordinator  [facility]

## 2020-08-28 NOTE — TELEPHONE ENCOUNTER
Spoke with patient 8/28/20  Total telephone time 15 minutes.   Patient was advised to follow-up with endocrinology at the time of her recent follow-up with rheumatology and advised to call the clinic.  Patient reports that she has been on prednisone 2.5 mg daily for at least 3 months or longer.  Patient reports that she has been having these dizzy spells which comes and go.  Once she gets the dizzy spells usually lasts have a day or so.  This week she has not had any dizzy spells.  Dizzy spells are described as a sensation of spinning.  She has been having lightheadedness since prednisone dose was dropped to 2.5 mg daily.  She added lightheadedness also feels better this week.    She checked her vitals this morning which was-/76  HR 61   No nausea or vomiting   No Weight loss   Appetite is good.   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  Patient was advised to increase the prednisone dose to 5 mg daily however she has not done so as she has not noticed anything worsening over the last 3 months.  Noted recently done visit metabolic panel below.      Plan  Prednisone 2.5 mg daily.  Advised to increase the dose to 5 mg daily if persistent of symptoms or if symptoms recur.  Take prednisone between 7-8 AM in the morning.  Schedule blood work at 8 AM in the morning on 8/31/2020 at her local clinic.  On the day of the blood work do not take prednisone but bring it with you to the laboratory so that you can take it as soon as blood is drawn.  We will schedule telephone appointment after the results to discuss next steps.  Patient has been on prednisone 2.5 mg daily for the last 3 months and she is reluctant to increase the dose however I discussed with the patient that if worsening of symptoms or if her symptoms recur [she feels better this week] to increase the dose to 5 mg daily.  Patient verbalized  understanding.

## 2020-09-04 ENCOUNTER — TELEPHONE (OUTPATIENT)
Dept: ENDOCRINOLOGY | Facility: CLINIC | Age: 80
End: 2020-09-04

## 2020-09-04 NOTE — TELEPHONE ENCOUNTER
Will send notice to Dr. Ignacio that ACTH and Cortisol results are now available in lab tab (outside labs from [facility]).      Selma Dumont RN  Endocrine Care Coordinator  [facility]

## 2020-09-08 NOTE — TELEPHONE ENCOUNTER
Chronic therapy with steroid.   Now on prednisone 2.5 mg daily for at least 3 months or longer.    Having non-sp symptoms which were concerning for adrenal insufficiency given long standing therapy with steroid. Screening for AI done after withholding steroid for 24 hours.    As documented above the ACTH and cortisol levels came back within the normal limits and cortisol level of close to 17 rules out possibility of adrenal insufficiency.    patient can continue to taper prednisone as per advice provided from her rheumatologist.  Her current symptoms are not secondary to adrenal insufficiency.  Intermittent dizzy spells are suspicious for vertigo and recommend follow-up with her primary care physician for further work-up.    We will be happy to see her in clinic as scheduled if any additional questions or additional input needed in tapering prednisone.    Please advise to patient results and the aforementioned plan.

## 2020-09-08 NOTE — TELEPHONE ENCOUNTER
Patient advised of results and recommendations from Dr. Ignacio. Patient verbalizes understanding and agrees to plan.     Will update Dr. Beaulieu's office and patient will follow-up with her primary care physician for vertigo evaluation.       Selma Dumont RN  Endocrine Care Coordinator  [facility]

## 2020-09-09 NOTE — TELEPHONE ENCOUNTER
Adrenal insufficiency is not present in this patient.   Plan to ask patient to undergo evaluation in primary care for complaint of dizziness.   Plan to reduce the prednisone to 2.5 mg every other day for 1 month, then stop.     Per the above recommendations from Dr. Beaulieu called Libertad and discussed recent results and recommendations.     She expressed understanding and did not have any questions.     Lyssa Miramontes, CHACHON, RN   Rheumatology Care Coordinator   [facility]

## 2020-09-09 NOTE — TELEPHONE ENCOUNTER
Pt states she forgot to ask Lyssa some questions about the medication. Pt is requesting a call back to discuss dosing of prednisone. Please advise.

## 2020-09-09 NOTE — TELEPHONE ENCOUNTER
Called patient and left generic vm asking them to call the clinic when they received the message. Clinic number provided.    CHACHO MoranN, RN   Rheumatology Care Coordinator   [facility]

## 2020-09-29 ENCOUNTER — VIRTUAL VISIT (OUTPATIENT)
Dept: ENDOCRINOLOGY | Facility: CLINIC | Age: 80
End: 2020-09-29
Payer: MEDICARE

## 2020-09-29 DIAGNOSIS — Z79.52 CURRENT CHRONIC USE OF SYSTEMIC STEROIDS: Primary | ICD-10-CM

## 2020-09-29 PROCEDURE — 99441 ZZC PHYSICIAN TELEPHONE EVALUATION 5-10 MIN: CPT | Mod: 95 | Performed by: INTERNAL MEDICINE

## 2020-09-29 NOTE — PATIENT INSTRUCTIONS
Libertad,    1) Blood work results didn't indicate adrenal insufficiency based on results below. Steroid taper can can continue as planned from adrenal disease perspective.

## 2020-09-29 NOTE — LETTER
"    9/29/2020         RE: Libertad Sanchez  [address] [place] [id number]        Dear Colleague,    Thank you for referring your patient, Libertad Sanchez, to the [facility]. Please see a copy of my visit note below.    Libertad Sanchez is a 80 year old female who is being evaluated via a billable telephone visit.      The patient has been notified of following:     \"This telephone visit will be conducted via a call between you and your physician/provider. We have found that certain health care needs can be provided without the need for a physical exam.  This service lets us provide the care you need with a short phone conversation.  If a prescription is necessary we can send it directly to your pharmacy.  If lab work is needed we can place an order for that and you can then stop by our lab to have the test done at a later time.    Telephone visits are billed at different rates depending on your insurance coverage. During this emergency period, for some insurers they may be billed the same as an in-person visit.  Please reach out to your insurance provider with any questions.    If during the course of the call the physician/provider feels a telephone visit is not appropriate, you will not be charged for this service.\"    Patient has given verbal consent for Telephone visit?  Yes    What phone number would you like to be contacted at? [phone]    How would you like to obtain your AVS? Mail a copy  Endocrinology telephone Visit    Chief Complaint: Endocrine Problem     Information obtained from:Patient     Subjective:         HPI: Libertad Sanchez is a 80 year old female with history of polymyalgia rheumatica currently on steroid therapy who is here for a follow up of possible adrenal insufficiency.    Now on prednisone 2.5 mg daily for at least 3 months or longer.      Having non-sp symptoms which were concerning for adrenal insufficiency given long standing therapy with steroid. Screening " for AI done after withholding steroid for 24 hours.     As documented below the ACTH and cortisol levels came back within the normal limits and cortisol level of close to 17 rules out possibility of adrenal insufficiency.     Patient can continue to taper prednisone as per advice provided from her rheumatologist.      She reported intermittent dizzy spells which are suspicious for vertigo and recommended follow-up with her primary care physician for further work-up. She reported today interval improvement of dizzy spells.          ROS negative otherwise today.      No Known Allergies    Current Outpatient Medications   Medication Sig Dispense Refill     acetaminophen (TYLENOL) 325 MG tablet Take 325 mg by mouth as needed       Calcium Carb-Cholecalciferol 500-400 MG-UNIT CHEW Take 1 tablet by mouth daily       levothyroxine (SYNTHROID/LEVOTHROID) 75 MCG tablet Take 75 mcg by mouth daily       predniSONE (DELTASONE) 5 MG tablet Take 1/2 tablet (2.5 mg) thereafter. 30 tablet 2     RISEdronate (ACTONEL) 35 MG tablet Take 35 mg by mouth every 7 days          Review of Systems     5 point review system (Constitutional, HENT, Eyes, Respiratory, Cardiovascular, Gastrointestinal, Genitourinary, Musculoskeletal,Neurological, Psychiatric/Behavioural, Endocrine) is negative or is as per HPI above    Past Medical History:   Diagnosis Date     Colitis      Hypothyroidism      Osteoporosis    Normal thyroid exam findings and no nodules appreciated    Past Surgical History:   Procedure Laterality Date     TONSILLECTOMY         Family History   Problem Relation Age of Onset     Alzheimer Disease Mother        Social History     Socioeconomic History     Marital status:      Spouse name: None     Number of children: None     Years of education: None     Highest education level: None   Occupational History     None   Social Needs     Financial resource strain: None     Food insecurity:     Worry: None     Inability: None      Transportation needs:     Medical: None     Non-medical: None   Tobacco Use     Smoking status: Never Smoker     Smokeless tobacco: Never Used   Substance and Sexual Activity     Alcohol use: None     Drug use: None     Sexual activity: None   Lifestyle     Physical activity:     Days per week: None     Minutes per session: None     Stress: None   Relationships     Social connections:     Talks on phone: None     Gets together: None     Attends Religion service: None     Active member of club or organization: None     Attends meetings of clubs or organizations: None     Relationship status: None     Intimate partner violence:     Fear of current or ex partner: None     Emotionally abused: None     Physically abused: None     Forced sexual activity: None   Other Topics Concern     None   Social History Narrative     None       Objective:   There were no vitals taken for this visit.  Constitutional: Pleasant   Psychological: appropriate mood and affect     In House Labs:                   Creatinine   Date Value Ref Range Status   02/03/2020 1.14 (A) 0.57 - 1.11 mg/dL Final       Assessment/Treatment Plan:      Current chronic steroid use; concern for possible adrenal insufficiency (AI): Now on prednisone 2.5 mg daily for at least 3 months or longer. Has been on systemic steroids >1 year at a higher dose.   She has been having dizziness (reports interval improvement of dizziness-description fits more vertigo) symptoms which were concerning for adrenal insufficiency given long standing therapy with steroid. Screening for AI done after withholding steroid for 24 hours. As documented above the ACTH and cortisol levels came back within the normal limits and baseline cortisol level of close to 17 rules out possibility of adrenal insufficiency.  Patient can continue to taper prednisone as advised from Rheumatology.  questions addressed.       Pt is currently following with PCP regarding osteoporosis.     Patient  Instructions   Libertad,    1) Blood work results didn't indicate adrenal insufficiency based on results below. Steroid taper can can continue as planned from adrenal disease perspective.            I will contact the patient with the test results.  Return to clinic  As needed.         Marvel Ignacio MD  Staff Endocrinologist    [phone]  Division of Endocrinology and Diabetes          Phone call duration: 8 minutes         Again, thank you for allowing me to participate in the care of your patient.        Sincerely,        Marvel Ignacio MD

## 2020-09-29 NOTE — PROGRESS NOTES
"Libertad Sanchez is a 80 year old female who is being evaluated via a billable telephone visit.      The patient has been notified of following:     \"This telephone visit will be conducted via a call between you and your physician/provider. We have found that certain health care needs can be provided without the need for a physical exam.  This service lets us provide the care you need with a short phone conversation.  If a prescription is necessary we can send it directly to your pharmacy.  If lab work is needed we can place an order for that and you can then stop by our lab to have the test done at a later time.    Telephone visits are billed at different rates depending on your insurance coverage. During this emergency period, for some insurers they may be billed the same as an in-person visit.  Please reach out to your insurance provider with any questions.    If during the course of the call the physician/provider feels a telephone visit is not appropriate, you will not be charged for this service.\"    Patient has given verbal consent for Telephone visit?  Yes    What phone number would you like to be contacted at? [phone]    How would you like to obtain your AVS? Mail a copy  Endocrinology telephone Visit    Chief Complaint: Endocrine Problem     Information obtained from:Patient     Subjective:         HPI: Libertad Sanchez is a 80 year old female with history of polymyalgia rheumatica currently on steroid therapy who is here for a follow up of possible adrenal insufficiency.    Now on prednisone 2.5 mg daily for at least 3 months or longer.      Having non-sp symptoms which were concerning for adrenal insufficiency given long standing therapy with steroid. Screening for AI done after withholding steroid for 24 hours.     As documented below the ACTH and cortisol levels came back within the normal limits and cortisol level of close to 17 rules out possibility of adrenal insufficiency.     Patient can " continue to taper prednisone as per advice provided from her rheumatologist.      She reported intermittent dizzy spells which are suspicious for vertigo and recommended follow-up with her primary care physician for further work-up. She reported today interval improvement of dizzy spells.          ROS negative otherwise today.      No Known Allergies    Current Outpatient Medications   Medication Sig Dispense Refill     acetaminophen (TYLENOL) 325 MG tablet Take 325 mg by mouth as needed       Calcium Carb-Cholecalciferol 500-400 MG-UNIT CHEW Take 1 tablet by mouth daily       levothyroxine (SYNTHROID/LEVOTHROID) 75 MCG tablet Take 75 mcg by mouth daily       predniSONE (DELTASONE) 5 MG tablet Take 1/2 tablet (2.5 mg) thereafter. 30 tablet 2     RISEdronate (ACTONEL) 35 MG tablet Take 35 mg by mouth every 7 days          Review of Systems     5 point review system (Constitutional, HENT, Eyes, Respiratory, Cardiovascular, Gastrointestinal, Genitourinary, Musculoskeletal,Neurological, Psychiatric/Behavioural, Endocrine) is negative or is as per HPI above    Past Medical History:   Diagnosis Date     Colitis      Hypothyroidism      Osteoporosis    Normal thyroid exam findings and no nodules appreciated    Past Surgical History:   Procedure Laterality Date     TONSILLECTOMY         Family History   Problem Relation Age of Onset     Alzheimer Disease Mother        Social History     Socioeconomic History     Marital status:      Spouse name: None     Number of children: None     Years of education: None     Highest education level: None   Occupational History     None   Social Needs     Financial resource strain: None     Food insecurity:     Worry: None     Inability: None     Transportation needs:     Medical: None     Non-medical: None   Tobacco Use     Smoking status: Never Smoker     Smokeless tobacco: Never Used   Substance and Sexual Activity     Alcohol use: None     Drug use: None     Sexual activity:  None   Lifestyle     Physical activity:     Days per week: None     Minutes per session: None     Stress: None   Relationships     Social connections:     Talks on phone: None     Gets together: None     Attends Sabianist service: None     Active member of club or organization: None     Attends meetings of clubs or organizations: None     Relationship status: None     Intimate partner violence:     Fear of current or ex partner: None     Emotionally abused: None     Physically abused: None     Forced sexual activity: None   Other Topics Concern     None   Social History Narrative     None       Objective:   There were no vitals taken for this visit.  Constitutional: Pleasant   Psychological: appropriate mood and affect     In House Labs:                   Creatinine   Date Value Ref Range Status   02/03/2020 1.14 (A) 0.57 - 1.11 mg/dL Final       Assessment/Treatment Plan:      Current chronic steroid use; concern for possible adrenal insufficiency (AI): Now on prednisone 2.5 mg daily for at least 3 months or longer. Has been on systemic steroids >1 year at a higher dose.   She has been having dizziness (reports interval improvement of dizziness-description fits more vertigo) symptoms which were concerning for adrenal insufficiency given long standing therapy with steroid. Screening for AI done after withholding steroid for 24 hours. As documented above the ACTH and cortisol levels came back within the normal limits and baseline cortisol level of close to 17 rules out possibility of adrenal insufficiency.  Patient can continue to taper prednisone as advised from Rheumatology.  questions addressed.       Pt is currently following with PCP regarding osteoporosis.     Patient Instructions   Libertad,    1) Blood work results didn't indicate adrenal insufficiency based on results below. Steroid taper can can continue as planned from adrenal disease perspective.            I will contact the patient with the test  results.  Return to clinic  As needed.         Marvel Ignacio MD  Staff Endocrinologist    [phone]  Division of Endocrinology and Diabetes          Phone call duration: 8 minutes

## 2020-11-08 ENCOUNTER — MYC MEDICAL ADVICE (OUTPATIENT)
Dept: RHEUMATOLOGY | Facility: CLINIC | Age: 80
End: 2020-11-08

## 2020-11-10 ENCOUNTER — TELEPHONE (OUTPATIENT)
Dept: RHEUMATOLOGY | Facility: CLINIC | Age: 80
End: 2020-11-10

## 2020-11-10 NOTE — TELEPHONE ENCOUNTER
[facility] Call Center    Phone Message    May a detailed message be left on voicemail: yes     Reason for Call: Other: pt is at lab at [facility] up in [place] waiting for lab orders, please fax them over ASAP     Action Taken: Message routed to:  Adult Clinics: Rheumatology p [phone]    Travel Screening: Not Applicable

## 2020-11-10 NOTE — TELEPHONE ENCOUNTER
Lab orders faxed.     CHACHO MoranN, RN   Rheumatology Care Coordinator   [facility]

## 2020-11-10 NOTE — TELEPHONE ENCOUNTER
Called lab to confirm fax number.     Lab orders faxed.     CHACHO MoranN, RN   Rheumatology Care Coordinator   [facility]

## 2020-11-17 ENCOUNTER — TELEPHONE (OUTPATIENT)
Dept: RHEUMATOLOGY | Facility: CLINIC | Age: 80
End: 2020-11-17

## 2020-11-17 NOTE — LETTER
Patient:  Libertad Sanchez  :   1940  MRN:     [id number]        Ms.Marlene LANA Sanchez  [address] [place] [address]        2020    Dear ,    We are writing to inform you of your test results.    These results do not require a change.  Please discuss at your next visit.  It was a pleasure to see you at your recent visit. Please let me know if you have any questions or concerns.    Allan Beaulieu MD  Professor of Medicine  Director, Division of Rheumatic and Autoimmune Diseases          View Detailed Reports   View Condensed Results Report   SEDIMENTATION RATE  Order: [id number]  (suggestion)  Information displayed in this report will not trend or trigger automated decision support.    Ref Range & Units 7d ago   SEDIMENTATION RATE        <30 mm/hr 19    Specimen Collected: 11/10/20  2:18 PM Last Resulted: 11/10/20  6:10 PM   Received From: [facility]  Result Received: 20  9:19 AM    Received Information   C-REACTIVE PROTEIN  Order: [id number]  (suggestion)  Information displayed in this report will not trend or trigger automated decision support.    Ref Range & Units 7d ago   C-REACTIVE PROTEIN        <0.50 mg/dL 0.45    Specimen Collected: 11/10/20  2:18 PM Last Resulted: 11/10/20  6:13 PM   Received From: [facility]  Result Received: 20  9:19 AM    Received Information   Contains abnormal data RENAL FUNCTION PANEL  Order: [id number]  (suggestion)  Information displayed in this report will not trend or trigger automated decision support.    Ref Range & Units 7d ago   SODIUM 135 - 145 mmol/L 139    POTASSIUM 3.5 - 5.0 mmol/L 4.1    CHLORIDE 98 - 110 mmol/L 105    CO2,TOTAL 21 - 31 mmol/L 26    ANION GAP 5 - 18  8    GLUCOSE 65 - 100 mg/dL 85    CALCIUM 8.5 - 10.5 mg/dL 9.1    BUN 8 - 25 mg/dL 15    CREATININE 0.57 - 1.11 mg/dL 1.03    BUN/CREAT RATIO           10 - 20  15    GFR if  >60  ml/min/1.73m2 >60    GFR if not African American >60 ml/min/1.73m2 51Low     PHOSPHORUS 2.3 - 4.7 mg/dL 3.4    ALBUMIN 3.2 - 4.6 g/dL 3.9    Specimen Collected: 11/10/20  2:18 PM Last Resulted: 11/10/20  6:14 PM   Received From: [facility]  Result Received: 11/17/20  9:19 AM    Received Information         [id number]  1940

## 2020-11-20 ENCOUNTER — VIRTUAL VISIT (OUTPATIENT)
Dept: RHEUMATOLOGY | Facility: CLINIC | Age: 80
End: 2020-11-20
Payer: MEDICARE

## 2020-11-20 DIAGNOSIS — M35.3 PMR (POLYMYALGIA RHEUMATICA) (H): Primary | Chronic | ICD-10-CM

## 2020-11-20 DIAGNOSIS — M54.50 CHRONIC BILATERAL LOW BACK PAIN, UNSPECIFIED WHETHER SCIATICA PRESENT: ICD-10-CM

## 2020-11-20 DIAGNOSIS — R76.8 POSITIVE ANA (ANTINUCLEAR ANTIBODY): ICD-10-CM

## 2020-11-20 DIAGNOSIS — G89.29 CHRONIC BILATERAL LOW BACK PAIN, UNSPECIFIED WHETHER SCIATICA PRESENT: ICD-10-CM

## 2020-11-20 DIAGNOSIS — M19.041 PRIMARY OSTEOARTHRITIS OF BOTH HANDS: Chronic | ICD-10-CM

## 2020-11-20 DIAGNOSIS — M81.0 AGE-RELATED OSTEOPOROSIS WITHOUT CURRENT PATHOLOGICAL FRACTURE: Chronic | ICD-10-CM

## 2020-11-20 DIAGNOSIS — Z79.60 LONG-TERM USE OF IMMUNOSUPPRESSANT MEDICATION: ICD-10-CM

## 2020-11-20 DIAGNOSIS — M19.042 PRIMARY OSTEOARTHRITIS OF BOTH HANDS: Chronic | ICD-10-CM

## 2020-11-20 PROCEDURE — 99443 PR PHYSICIAN TELEPHONE EVALUATION 21-30 MIN: CPT | Mod: 95 | Performed by: INTERNAL MEDICINE

## 2020-11-20 NOTE — PATIENT INSTRUCTIONS
Plan for Xrays of the low back to evaluate your back pain and leg pain. Contact central imaging scheduling at [phone] to schedule Xrays.  Contact us if symptoms of COVID-19 develop  Follow up with me in 3 months

## 2021-01-05 ENCOUNTER — TELEPHONE (OUTPATIENT)
Dept: RHEUMATOLOGY | Facility: CLINIC | Age: 81
End: 2021-01-05

## 2021-01-05 NOTE — TELEPHONE ENCOUNTER
Patient calling to have xray orders faxed to the [facility] imaging center fax # [phone].

## 2021-01-06 NOTE — TELEPHONE ENCOUNTER
Called patient and let her know I faxed over the imaging orders to [facility] as requested at [phone]. Fax confirmed by [employer]. She had no further questions at this time.     Avelina Kessler, BSN, RN  Medical Specialty Care Coordinator  [facility]

## 2021-01-15 ENCOUNTER — HEALTH MAINTENANCE LETTER (OUTPATIENT)
Age: 81
End: 2021-01-15

## 2021-02-01 ENCOUNTER — IMMUNIZATION (OUTPATIENT)
Dept: PEDIATRICS | Facility: CLINIC | Age: 81
End: 2021-02-01
Payer: MEDICARE

## 2021-02-01 PROCEDURE — 91300 PR COVID VAC PFIZER DIL RECON 30 MCG/0.3 ML IM: CPT

## 2021-02-01 PROCEDURE — 0001A PR COVID VAC PFIZER DIL RECON 30 MCG/0.3 ML IM: CPT

## 2021-02-22 ENCOUNTER — IMMUNIZATION (OUTPATIENT)
Dept: PEDIATRICS | Facility: CLINIC | Age: 81
End: 2021-02-22
Attending: INTERNAL MEDICINE
Payer: MEDICARE

## 2021-02-22 PROCEDURE — 0002A PR COVID VAC PFIZER DIL RECON 30 MCG/0.3 ML IM: CPT

## 2021-02-22 PROCEDURE — 91300 PR COVID VAC PFIZER DIL RECON 30 MCG/0.3 ML IM: CPT

## 2021-02-26 ENCOUNTER — TELEPHONE (OUTPATIENT)
Dept: RHEUMATOLOGY | Facility: CLINIC | Age: 81
End: 2021-02-26

## 2021-02-26 ENCOUNTER — VIRTUAL VISIT (OUTPATIENT)
Dept: RHEUMATOLOGY | Facility: CLINIC | Age: 81
End: 2021-02-26
Payer: MEDICARE

## 2021-02-26 DIAGNOSIS — M35.3 PMR (POLYMYALGIA RHEUMATICA) (H): Chronic | ICD-10-CM

## 2021-02-26 DIAGNOSIS — M19.042 PRIMARY OSTEOARTHRITIS OF BOTH HANDS: Chronic | ICD-10-CM

## 2021-02-26 DIAGNOSIS — R76.8 POSITIVE ANA (ANTINUCLEAR ANTIBODY): Primary | ICD-10-CM

## 2021-02-26 DIAGNOSIS — M19.041 PRIMARY OSTEOARTHRITIS OF BOTH HANDS: Chronic | ICD-10-CM

## 2021-02-26 PROCEDURE — 99442 PR PHYSICIAN TELEPHONE EVALUATION 11-20 MIN: CPT | Mod: 95 | Performed by: INTERNAL MEDICINE

## 2021-02-26 RX ORDER — METOPROLOL TARTRATE 25 MG/1
25 TABLET, FILM COATED ORAL 2 TIMES DAILY
COMMUNITY
Start: 2021-02-16

## 2021-02-26 NOTE — PROGRESS NOTES
Libertad is a 80 year old who is being evaluated via a billable telephone visit.      What phone number would you like to be contacted at? [phone] (H)  How would you like to obtain your AVS? Thania  Phone call duration: 16 minutes      Andie Grimes LPN  Pulmonary Division:  [facility]  Phone: [phone] Fax: [phone]        Ms. Sanchez has PMR since February 2019. +CHELSEY, +RNP, dsDNA-, Sm-, SSA/B-.     She has been vaccinated for COVID-19 and no history of infection.    She feels better today with initiation of metoprolol for super ventricular tachycardia. Energy is good. She does feel stiff and achy in the morning for 2 hours. No joint swelling, redness, or warmth and no joint dysfunction. Her muscle strength is stable and she is just now starting to treadmill.    Her back is better today, and her symptoms intermittent exacerbate. This feels like muscular pain and there is no radiating pain. This can occasionally keep her awake at night. Spine films are unremarkable.     Actonel 35 mg weekly for osteopenia. Vitamin D and calcium supplementation.     PMI:  Medical-hypothyroidism, osteoporosis (T = -1.5 3-2019), empyema, carpal tunnel disease right, collagenous colitis, plantar fasciitis, eczema, lumbar spine DJD, PMR  Surgical-bunionectomy bilateral, carpal tunnel release, cataract surgery bilateral, right lung resection/pleural surgery/evacuation, tonsillectomy  Injuries-tailbone injury    SH:  , previous  provider, 5 children, no tobacco or EtOH. Right handed.    FH:  Mother dead with alzheimer's  Father dead with MI  Sibs and children are healthy    PMSF history personally obtained and updated by me this visit.    ROS:  +palpitations  +low back pain  +alendronate intolerant  Remainder of the 14 point ROS obtained and found negative.    Laboratory:    Result Narrative   EXAM: XR SPINE LUMBAR 3 VIEWS  LOCATION: [facility]  DATE/TIME: 1/8/2021 2:19 PM    INDICATION:  PMR (polymyalgia rheumatica) (HC). Age-related osteoporosis without current pathological fracture. Primary osteoarthritis of both hands. Long-term use of immunosuppressant medication. Positive CHELSEY (antinuclear antibody). Chronic bilateral low back pain, unspecified.  COMPARISON: None.  TECHNIQUE: CR Lumbar Spine.    IMPRESSION: Five lumbar type vertebral bodies are identified. Satisfactory vertebral body height in the lumbar spine. Anterior subluxation L5 upon S1 of about 2 mm, 1 mm degenerative retrolisthesis L3 upon L4 and L4 upon L5. Rightward rotary lumbar sclerotic curve, apex L2. Interspace narrowing and osteophytes, most marked at mid and lower lumbar levels with lower lumbar spine facet joint arthropathy. Degenerative changes are noted involving the pelvic ring with pelvic vascular calcification. Slight elevation right hemidiaphragm. Satisfactory sacrococcygeal alignment.         Impression:    Polymyalgia rheumatica-in remission. There is no indication for immunosuppression at this time. Get inflammatory markers now and repeat again in a year.     Positive CHELSEY-without evidence of connective tissue disease. No signs of systemic inflammatory disease today.    Low back pain-less severe today. Lumbar spine films only show expected DJD changes but no severe spondylolisthesis. If this exacerbates or becomes associated with neurological symptoms, then MRI would be indicated.     Osteoarthritis-stable.    Follow with me in 12 months.        Phone call duration: 16 minutes    Allan Beaulieu MD

## 2021-02-26 NOTE — TELEPHONE ENCOUNTER
Contacted [facility] @ [phone]. Spoke with radiology and requested pts XRAY spine from 01/08/2021 be pushed to  PACS for review. Will await images.      Andie Grimes LPN  Pulmonary Division:  [facility]  Phone: [phone] Fax: [phone]

## 2021-03-01 ENCOUNTER — TELEPHONE (OUTPATIENT)
Dept: RHEUMATOLOGY | Facility: CLINIC | Age: 81
End: 2021-03-01

## 2021-03-01 NOTE — TELEPHONE ENCOUNTER
Dr. Beaulieu's future lab orders have been faxed to [facility] Fax # [phone].      Andie Grimes LPN  Pulmonary Division:  [facility]  Phone: [phone] Fax: [phone]

## 2021-09-04 ENCOUNTER — HEALTH MAINTENANCE LETTER (OUTPATIENT)
Age: 81
End: 2021-09-04

## 2022-01-31 NOTE — PROGRESS NOTES
Libertad is a 81 year old who is being evaluated via a billable telephone visit.      What phone number would you like to be contacted at? [phone]  How would you like to obtain your AVS? Mail a copy  Phone call duration: 7 minutes        Ms. Sanchez has PMR since February 2019. +CHELSEY, +RNP, dsDNA-, Sm-, SSA/B-.     She feels a lot better today. She had a period a period of palpitations due to atrial flutter, now s/p ablation. She feels better today.    She has aching in the morning but no AM stiffness. She doesn't think PMR has come back.     She has some aching in her hands but hand function is good. She will use tylenol for hand pains as needed.    Actonel 35 mg weekly for osteopenia. Vitamin D and calcium supplementation.     PMI:  Medical-hypothyroidism, osteoporosis (T = -1.5 3-2019), empyema, carpal tunnel disease right, collagenous colitis, plantar fasciitis, eczema, lumbar spine DJD, PMR, CAD, atrial flutter/fibrillation  Surgical-bunionectomy bilateral, carpal tunnel release, cataract surgery bilateral, right lung resection/pleural surgery/evacuation, tonsillectomy, cardiac ablation  Injuries-tailbone injury    SH:  , previous  provider, 5 children, no tobacco or EtOH. Right handed.    FH:  Mother dead with alzheimer's  Father dead with MI  Sibs and children are healthy    PMSF history personally obtained and updated by me this visit.    ROS:  +as above  +alendronate intolerant  Remainder of the 14 point ROS obtained and found negative.    Laboratory:      [facility]  Outside Information            (suggestion)  Information displayed in this report will not trend and will not trigger automated decision support.        Contains abnormal data BASIC METABOLIC PANEL  Order: [id number]   Ref Range & Units 2 wk ago   SODIUM 135 - 145 mmol/L 138    POTASSIUM 3.5 - 5.0 mmol/L 3.9    CHLORIDE 98 - 110 mmol/L 103    CO2,TOTAL 21 - 31 mmol/L 25    ANION GAP 5 - 18 10     GLUCOSE 65 - 100 mg/dL 105 High     CALCIUM 8.5 - 10.5 mg/dL 9.7    BUN 8 - 25 mg/dL 25    CREATININE 0.57 - 1.11 mg/dL 1.07    BUN/CREAT RATIO           10 - 20 23 High     GFR if African American >60 ml/min/1.73m2 60 Low     GFR if not African American >60 ml/min/1.73m2 49 Low     Resulting [facility] LABORATORY   Specimen Collected: 02/09/22  1:57 PM Last Resulted: 02/09/22  6:08 PM   Received From: [facility]  Result Received: 02/25/22  7:04 AM    View Encounter       Received Information        Result Report    BASIC METABOLIC PANEL (Order #[id number]) on 2/9/22         Order Report    BASIC METABOLIC PANEL (Order #[id number]) on 2/9/22      Reprint Order Requisition    BASIC METABOLIC PANEL (Order #[id number]) on 2/9/22        Tracking Links    Cosign Tracking Order Transmittal Tracking       [facility]  Outside Information            (suggestion)  Information displayed in this report will not trend and will not trigger automated decision support.       CBC with Platelet no Diff  Order: [id number]   Ref Range & Units 5 mo ago   WHITE BLOOD COUNT         4.5 - 11.0 thou/cu mm 9.1    RED BLOOD COUNT           4.00 - 5.20 mil/cu mm 4.05    HEMOGLOBIN                12.0 - 16.0 g/dL 13.2    HEMATOCRIT                33.0 - 51.0 % 40.0    MCV                       80 - 100 fL 99    MCH                       26.0 - 34.0 pg 32.6    MCHC                      32.0 - 36.0 g/dL 33.0    RDW                       11.5 - 15.5 % 12.5    PLATELET COUNT            140 - 440 thou/cu mm 258    MPV                       6.5 - 11.0 fL 10.6    Resulting Agency  [facility] LABORATORY   Specimen Collected: 09/28/21 10:23 AM Last Resulted: 09/28/21 11:00 AM   Received From: [facility]  Result Received: 02/25/22  7:04 AM    View Encounter       Received Information        Result Report    CBC with Platelet no Diff  (Order #[id number]) on 9/28/21         Order Report    CBC with Platelet no Diff (Order #[id number]) on 9/28/21      Reprint Order Requisition    CBC with Platelet no Diff (Order #[id number]) on 9/28/21        Tracking Links    Cosign Tracking Order Transmittal Tracking         View Detailed Reports     View Condensed Results Report       C-REACTIVE PROTEIN  Order: [id number]   (suggestion)  Information displayed in this report will not trend and will not trigger automated decision support.      Ref Range & Units 11 mo ago   C-REACTIVE PROTEIN        <0.50 mg/dL 0.39    Specimen Collected: 03/15/21  1:38 PM Last Resulted: 03/15/21  2:29 PM   Received From: [facility]  Result Received: 02/25/22  7:04 AM    View Encounter       Received Information            SEDIMENTATION RATE  Order: [id number]   (suggestion)  Information displayed in this report will not trend and will not trigger automated decision support.      Ref Range & Units 11 mo ago   SEDIMENTATION RATE        <30 mm/hr 26    Specimen Collected: 03/15/21  1:38 PM Last Resulted: 03/15/21  4:14 PM   Received From: [facility]  Result Received: 02/25/22  7:04 AM    View Encounter       Received Information        Impression:    Polymyalgia rheumatica-this is now remitted and it is my impression that she is now at low risk for relapse. I will discharge her from my clinic now, and she will contact me in the future should the need arise.    Positive CHELSEY-without evidence of connective tissue disease.     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    Osteoporosis-on therapy with actonel.    Atrial fibrillation anticoagulation.    Follow with me prn.    A total of 12 minutes was spent in telephone patient interaction and chart review on the day of service.

## 2022-02-19 ENCOUNTER — HEALTH MAINTENANCE LETTER (OUTPATIENT)
Age: 82
End: 2022-02-19

## 2022-02-25 ENCOUNTER — VIRTUAL VISIT (OUTPATIENT)
Dept: RHEUMATOLOGY | Facility: CLINIC | Age: 82
End: 2022-02-25
Payer: MEDICARE

## 2022-02-25 DIAGNOSIS — M19.041 PRIMARY OSTEOARTHRITIS OF BOTH HANDS: Primary | Chronic | ICD-10-CM

## 2022-02-25 DIAGNOSIS — M19.042 PRIMARY OSTEOARTHRITIS OF BOTH HANDS: Primary | Chronic | ICD-10-CM

## 2022-02-25 DIAGNOSIS — M81.0 AGE-RELATED OSTEOPOROSIS WITHOUT CURRENT PATHOLOGICAL FRACTURE: Chronic | ICD-10-CM

## 2022-02-25 DIAGNOSIS — M35.3 PMR (POLYMYALGIA RHEUMATICA) (H): Chronic | ICD-10-CM

## 2022-02-25 PROCEDURE — 99212 OFFICE O/P EST SF 10 MIN: CPT | Mod: 95 | Performed by: INTERNAL MEDICINE

## 2022-02-25 RX ORDER — FUROSEMIDE 20 MG
20 TABLET ORAL DAILY
COMMUNITY

## 2022-10-22 ENCOUNTER — HEALTH MAINTENANCE LETTER (OUTPATIENT)
Age: 82
End: 2022-10-22

## 2023-04-01 ENCOUNTER — HEALTH MAINTENANCE LETTER (OUTPATIENT)
Age: 83
End: 2023-04-01

## 2023-07-06 ENCOUNTER — TELEPHONE (OUTPATIENT)
Dept: RHEUMATOLOGY | Facility: CLINIC | Age: 83
End: 2023-07-06
Payer: MEDICARE

## 2023-07-06 NOTE — TELEPHONE ENCOUNTER
[facility] Call Center    Phone Message    May a detailed message be left on voicemail: yes     Reason for Call: Patient reports that for approximately 6 months now, she will go to sleep feeling ok, but is then woken up by a burning sensation in her lower calves-this occurs in both legs, as well as some times in her arms. She feels like this is a muscle issue, does not have joint pain related to this, but it does seem to have worsened over the last 6 months. Says this is similar to when she had a flare of polymyalgia rheumatica several years ago, although not quite as severe.  She would like to meet with Dr. 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 She would like to do a telephone visit with Dr. Beaulieu if possible?   She would also be agreeable to having lab tests done, but would like any orders for lab tests to be sent to the [facility] (PH: [phone], FAX: [phone]).    Pt's preferred pharmacy is [facility] in [place].     Please advise if ok to do telephone visit with Dr. Beaulieu, or if there are any other suggestions/recommendations for her?   Action Taken: Message routed to:  Adult Clinics: Rheumatology p [id number]    Travel Screening: Not Applicable

## 2023-07-11 NOTE — TELEPHONE ENCOUNTER
Spoke to the patient.   She states that about every 2-3 nights, she experiences muscle cramps and burning in her muscles in her legs and arms.  It feels like a emerson horse.  The pain is 8-10/10.  Walking around and movement helps improve the pain.  Pain during the day is 3/10.      Denies a.m. stiffness, joint pain,  knots in muscles, loss of strength, redness or swelling.     Very gradual onset, not nearly the same pain as PMR.    As discussed that last visit with Dr. Beaulieu, there is a very slim chance of reoccurrence.  Patient remembers this conversation.  However, since PCP suggested reaching out to Rheumatology, she is doing so.    The patient's PCP sent message regarding labs to the patient and noted that the CHELSEY remains elevated.   PCP stated no other lab concerns noted.     (ABNORMAL) ANTINUCLEAR ANTIBODY BY IFA (07/03/2023 12:10 PM   ANTINUCLEAR ANTIBODY (CHELSEY) Positive (A)     CHELSEY PATTERN 1 Homogenous      CHELSEY TITER 1 1:320 (A)     Routing to Dr. Beaulieu to advise.   If advise to see patient, she asks to do a telephone visit as she is the primary caregiver for her  and cannot do video visits.    Brenda Lawson RN

## 2023-07-12 NOTE — TELEPHONE ENCOUNTER
Called patient and gave message per Dr. Beaulieu.  Patient verbalized understanding and agreement to plan.   Patient was appreciative of Dr. Beaulieu's input.    Brenda Lawson RN

## 2023-08-26 ENCOUNTER — HEALTH MAINTENANCE LETTER (OUTPATIENT)
Age: 83
End: 2023-08-26

## 2024-10-19 ENCOUNTER — HEALTH MAINTENANCE LETTER (OUTPATIENT)
Age: 84
End: 2024-10-19

## 2024-12-18 ENCOUNTER — TRANSFERRED RECORDS (OUTPATIENT)
Dept: HEALTH INFORMATION MANAGEMENT | Facility: CLINIC | Age: 84
End: 2024-12-18